# Patient Record
Sex: FEMALE | Race: BLACK OR AFRICAN AMERICAN | Employment: OTHER | URBAN - METROPOLITAN AREA
[De-identification: names, ages, dates, MRNs, and addresses within clinical notes are randomized per-mention and may not be internally consistent; named-entity substitution may affect disease eponyms.]

---

## 2017-06-12 ENCOUNTER — LAB ONLY (OUTPATIENT)
Dept: ENDOCRINOLOGY | Age: 82
End: 2017-06-12

## 2017-06-12 DIAGNOSIS — E83.52 HYPERCALCEMIA: Primary | ICD-10-CM

## 2017-06-12 DIAGNOSIS — I10 ESSENTIAL HYPERTENSION, BENIGN: ICD-10-CM

## 2017-06-12 DIAGNOSIS — E55.9 VITAMIN D DEFICIENCY: ICD-10-CM

## 2017-06-13 LAB
25(OH)D3+25(OH)D2 SERPL-MCNC: 15.2 NG/ML (ref 30–100)
BUN SERPL-MCNC: 11 MG/DL (ref 8–27)
BUN/CREAT SERPL: 17 (ref 12–28)
CALCIUM SERPL-MCNC: 10.7 MG/DL (ref 8.7–10.3)
CHLORIDE SERPL-SCNC: 105 MMOL/L (ref 96–106)
CO2 SERPL-SCNC: 23 MMOL/L (ref 18–29)
CREAT SERPL-MCNC: 0.66 MG/DL (ref 0.57–1)
GLUCOSE SERPL-MCNC: 97 MG/DL (ref 65–99)
POTASSIUM SERPL-SCNC: 4.2 MMOL/L (ref 3.5–5.2)
SODIUM SERPL-SCNC: 145 MMOL/L (ref 134–144)

## 2017-06-16 ENCOUNTER — OFFICE VISIT (OUTPATIENT)
Dept: ENDOCRINOLOGY | Age: 82
End: 2017-06-16

## 2017-06-16 VITALS
BODY MASS INDEX: 35.37 KG/M2 | SYSTOLIC BLOOD PRESSURE: 149 MMHG | WEIGHT: 207.2 LBS | DIASTOLIC BLOOD PRESSURE: 75 MMHG | HEIGHT: 64 IN | HEART RATE: 62 BPM

## 2017-06-16 DIAGNOSIS — I10 ESSENTIAL HYPERTENSION, BENIGN: ICD-10-CM

## 2017-06-16 DIAGNOSIS — E83.52 HYPERCALCEMIA: Primary | ICD-10-CM

## 2017-06-16 DIAGNOSIS — E55.9 VITAMIN D DEFICIENCY: ICD-10-CM

## 2017-06-16 RX ORDER — LOSARTAN POTASSIUM 100 MG/1
100 TABLET ORAL DAILY
Qty: 90 TAB | Refills: 3 | Status: SHIPPED | OUTPATIENT
Start: 2017-06-16 | End: 2017-12-07

## 2017-06-16 RX ORDER — METOPROLOL SUCCINATE 100 MG/1
TABLET, EXTENDED RELEASE ORAL
Qty: 90 TAB | Refills: 3 | Status: SHIPPED | OUTPATIENT
Start: 2017-06-16 | End: 2017-12-07 | Stop reason: SDUPTHER

## 2017-06-16 RX ORDER — IBUPROFEN 200 MG
200 TABLET ORAL AS NEEDED
COMMUNITY
End: 2021-04-01

## 2017-06-16 RX ORDER — FUROSEMIDE 20 MG/1
TABLET ORAL
Qty: 40 TAB | Refills: 3 | Status: SHIPPED | OUTPATIENT
Start: 2017-06-16

## 2017-06-16 RX ORDER — AMLODIPINE BESYLATE 2.5 MG/1
2.5 TABLET ORAL DAILY
Qty: 90 TAB | Refills: 3 | Status: SHIPPED | OUTPATIENT
Start: 2017-06-16

## 2017-06-16 NOTE — LETTER
7/4/2017 8:53 PM 
 
Ms. Kevin Estevez [address] [address] I wanted to update you on your recent lab tests: 
 
I received labs from Dr. Patrick King that showed your calcium level was stable at 10.7 but this was only 2 weeks after your last visit. I still need your labs repeated again prior to your next visit in 6 months. Take care. Please don't hesitate to call [phone] with any further questions.  
 
Sincerely, 
 
 
 
Rashida Dempsey MD

## 2017-06-16 NOTE — MR AVS SNAPSHOT
Visit Information Date & Time Provider Department Dept. Phone Encounter #  
 6/16/2017 10:30 AM Tosin Mora, [address] [facility] [phone] [id number] Follow-up Instructions Return in about 6 months (around 12/16/2017). Upcoming Health Maintenance Date Due DTaP/Tdap/Td series (1 - Tdap) 11/23/1956 ZOSTER VACCINE AGE 60> 11/23/1997 GLAUCOMA SCREENING Q2Y 11/23/2002 OSTEOPOROSIS SCREENING (DEXA) 11/23/2002 Pneumococcal 65+ Low/Medium Risk (1 of 2 - PCV13) 11/23/2002 MEDICARE YEARLY EXAM 11/23/2002 INFLUENZA AGE 9 TO ADULT 8/1/2017 Allergies as of 6/16/2017  Review Complete On: 6/16/2017 By: Tosin Mora MD  
  
 Severity Noted Reaction Type Reactions Hydrochlorothiazide  06/28/2016    Other (comments) Will avoid as she has hyperparathyroidism to not make calcium levels worse Current Immunizations  Reviewed on 6/4/2012 No immunizations on file. Not reviewed this visit You Were Diagnosed With   
  
 Codes Comments Hypercalcemia    -  Primary ICD-10-CM: A53.52 
ICD-9-CM: 275.42 Vitamin D deficiency     ICD-10-CM: E55.9 ICD-9-CM: 268.9 Essential hypertension, benign     ICD-10-CM: I10 
ICD-9-CM: 401.1 Vitals BP Pulse Height(growth percentile) Weight(growth percentile) BMI OB Status 149/75 (BP 1 Location: Left arm) 62 5' 4\" (1.626 m) 207 lb 3.2 oz (94 kg) 35.57 kg/m2 Hysterectomy Smoking Status Former Smoker Vitals History BMI and BSA Data Body Mass Index Body Surface Area 35.57 kg/m 2 2.06 m 2 Preferred Pharmacy Pharmacy Name Phone [facility] [address], [address] [place] [address] Avenue [phone] Your Updated Medication List  
  
   
This list is accurate as of: 6/16/17 11:11 AM.  Always use your most recent med list. ADVIL 200 mg tablet Generic drug:  ibuprofen Take  by mouth. amLODIPine 2.5 mg tablet Commonly known as:  Claudell Hesselbach Take 1 Tab by mouth daily. furosemide 20 mg tablet Commonly known as:  LASIX Take 1 tab as needed for fluid. No more than 3 times per week  
  
 losartan 100 mg tablet Commonly known as:  COZAAR Take 1 Tab by mouth daily. metoprolol succinate 100 mg tablet Commonly known as:  TOPROL-XL Take 1 tab daily  
  
 potassium chloride SR 20 mEq tablet Commonly known as:  K-TAB Take 1 tab daily Prescriptions Sent to Pharmacy Refills  
 metoprolol succinate (TOPROL-XL) 100 mg tablet 3 Sig: Take 1 tab daily Class: Normal  
 Pharmacy: [address] [place], [address] [facility] Ph #: [phone]  
 amLODIPine (NORVASC) 2.5 mg tablet 3 Sig: Take 1 Tab by mouth daily. Class: Normal  
 Pharmacy: [address] [place], [address] [facility] Ph #: [phone] Route: Oral  
 furosemide (LASIX) 20 mg tablet 3 Sig: Take 1 tab as needed for fluid. No more than 3 times per week Class: Normal  
 Pharmacy: [address] [place], [address] [facility] Ph #: [phone]  
 losartan (COZAAR) 100 mg tablet 3 Sig: Take 1 Tab by mouth daily. Class: Normal  
 Pharmacy: [address] [place], [address] [facility] Ph #: [phone] Route: Oral  
  
We Performed the Following METABOLIC PANEL, BASIC [92918 CPT(R)] Follow-up Instructions Return in about 6 months (around 12/16/2017). Patient Instructions 1) Your calcium level is stable at 10.7, up slightly from 10.6 at Dr. Liam Snachez office but down from 11.2 at your last visit with me in 12/16. 2) Drink at least 4 8oz glasses of water everyday to stay hydrated to prevent your calcium level from going higher.  
 
3) We will decrease your amlodipine to 2.5 mg daily to see if this helps with leg swelling. Take 1/2 of the 5 mg tabs until these run out and then take 1 of the 2.5 mg tabs. 4) I will increase your toprol XL to 100 mg daily for blood pressure. Take 2 of the 50 mg tabs until these run out and then take 1 of the 100 mg tabs. 5) Your BUN and creatinine are markers of kidney function. Your values are normal. 
 
6) Your vitamin D level is still appropriately low for the high calcium. Do not take any calcium, multivitamin or vitamin D supplements. 7) Take the lab slip to Dr. David Walker office or [facility] 1 week before your next visit to have your labs drawn. Introducing [facility]! [employer] introduces [employer] patient portal. Now you can access parts of your medical record, email your doctor's office, and request medication refills online. 1. In your internet browser, go to [email or web address] (formerly [email or web address])t 2. Click on the First Time User? Click Here link in the Sign In box. You will see the New Member Sign Up page. 3. Enter your [employer] Access Code exactly as it appears below. You will not need to use this code after youve completed the sign-up process. If you do not sign up before the expiration date, you must request a new code. · [employer] Access Code: [email or web address] Expires: 9/14/2017 11:07 AM 
 
4. Enter the last four digits of your Social Security Number (xxxx) and Date of Birth (mm/dd/yyyy) as indicated and click Submit. You will be taken to the next sign-up page. 5. Create a Active [employer] ID. This will be your [employer] login ID and cannot be changed, so think of one that is secure and easy to remember. 6. Create a [employer] password. You can change your password at any time. 7. Enter your Password Reset Question and Answer. This can be used at a later time if you forget your password. 8. Enter your e-mail address. You will receive e-mail notification when new information is available in [address] 9. Click Sign Up. You can now view and download portions of your medical record. 10. Click the Download Summary menu link to download a portable copy of your medical information. If you have questions, please visit the Frequently Asked Questions section of the [employer] website. Remember, [employer] is NOT to be used for urgent needs. For medical emergencies, dial 911. Now available from your iPhone and Android! Please provide this summary of care documentation to your next provider. Your primary care clinician is listed as Kade Nicole. If you have any questions after today's visit, please call [phone].

## 2017-06-16 NOTE — PROGRESS NOTES
Chief Complaint   Patient presents with    Other     hypercalcemis    Other     pcp and pharmacy confirmed     History of Present Illness: Skyla Kearns is a 80 y.o. female here for follow up of hypercalcemia. Weight down 5 lbs since last visit in 12/16. Has been taking the higher dose of toprol XL 50 mg daily along with amlodipine 5 mg daily and losartan 100 mg daily for BP. Has been taking the lasix just 3 times a week but despite this, still has some swelling in the ankles. Not taking any calcium or vitamin D or mvi. No new kidney stones. No abd pain, n/v or constipation. No new bone pain. Has been trying to drink 4 8oz glasses of water everyday. Current Outpatient Prescriptions   Medication Sig    ibuprofen (ADVIL) 200 mg tablet Take  by mouth.  metoprolol succinate (TOPROL-XL) 50 mg XL tablet Take 1 tab daily    furosemide (LASIX) 20 mg tablet Take 1 tab as needed for fluid. No more than 3 times per week--Dose change 12/15/16--updated med list--did not send prescription to the pharmacy    amLODIPine (NORVASC) 5 mg tablet Take 5 mg by mouth daily.  potassium chloride 20 mEq TbER Take 1 tab daily    losartan (COZAAR) 100 mg tablet Take 1 Tab by mouth daily. Replaces losartan-hctz for blood pressure     No current facility-administered medications for this visit. Allergies   Allergen Reactions    Hydrochlorothiazide Other (comments)     Will avoid as she has hyperparathyroidism to not make calcium levels worse     Review of Systems:  - Cardiovascular: no chest pain  - Neurological: no tremors  - Integumentary: skin is normal    Physical Examination:  Blood pressure 149/75, pulse 62, height 5' 4\" (1.626 m), weight 207 lb 3.2 oz (94 kg).   - General: pleasant, no distress, good eye contact   - Neck: no carotid bruits  - Cardiovascular: regular, normal rate, nl s1 and s2, no m/r/g   - Respiratory: clear to auscultation bilaterally   - Integumentary: skin is normal, 1+ edema at ankles  - Neurological: reflexes 2+ at biceps, no tremors  - Psychiatric: normal mood and affect    Data Reviewed:   Component      Latest Ref Rng & Units 6/12/2017 6/12/2017          10:18 AM 10:18 AM   Glucose      65 - 99 mg/dL  97   BUN      8 - 27 mg/dL  11   Creatinine      0.57 - 1.00 mg/dL  0.66   GFR est non-AA      >59 mL/min/1.73  84   GFR est AA      >59 mL/min/1.73  97   BUN/Creatinine ratio      12 - 28  17   Sodium      134 - 144 mmol/L  145 (H)   Potassium      3.5 - 5.2 mmol/L  4.2   Chloride      96 - 106 mmol/L  105   CO2      18 - 29 mmol/L  23   Calcium      8.7 - 10.3 mg/dL  10.7 (H)   VITAMIN D, 25-HYDROXY      30.0 - 100.0 ng/mL 15.2 (L)        Assessment/Plan:     1. Hypercalcemia: From her chart it appears she was admitted to [facility] in 6/12 and her calcium was high during that stay up to 11.3. She had an elevated PTH of 196 and a normal TSH, ACE level and SPEP. As far as she recalls, nobody has further evaluated and it wasn't until her calcium was high at 11.3 in 12/15 with Dr. Salina Flores that she was given a referral.  Has been on hctz for many years. Likely she has primary hyperparathyroidism and her PTH level may also be higher due to underlying secondary hyperparathyroidism from vitamin D deficiency. Given her age, I would prefer to manage this condition conservatively. Her calcium was 10.9 in 6/16 at her initial visit with me and I stopped the hctz and changed to lasix but despite this her calcium went to 11.2 in 12/16. She has no history of kidney stones or osteoporosis to warrant more aggressive management at this time. I had her stop the lasix and just take this as needed in case this is dehydrating her and keeping her calcium up and level down to 10.6 at the end of 12/16 and still 10.7 in 6/17  - avoid calcium and mvi  - check bmp prior to next visit in 6 months      2.  Vitamin D deficiency: Was told that her vitamin D level was low by Alyson sometime prior to her visit with me in 6/16 and was taking 1000 units of OTC D3 daily but hadn't taken this in the past month prior to the visit and level was 17.7 in 6/16. I had her remain off any vitamin D and level down to 14 in 12/16 and still 15 in 6/17 but this is an appropriately low compensation for her degree of calcium elevation and I want her to stay off any vitamin D supplementation or this can make her calcium level higher.  - check Vitamin D 25-OH level prior to next visit     3. Essential hypertension, benign: BP > 140/90. Will double her toprol XL and decrease her amlodipine to see if this helps with leg swelling.  - cont losartan 100 mg daily  - take lasix 20 mg only as needed for leg swelling no more than 3 times per week  - decrease amlodipine to 2.5 mg daily  - increase toprol XL to 100 mg daily      Patient Instructions   1) Your calcium level is stable at 10.7, up slightly from 10.6 at Dr. Yenifer Dumont office but down from 11.2 at your last visit with me in 12/16. 2) Drink at least 4 8oz glasses of water everyday to stay hydrated to prevent your calcium level from going higher. 3) We will decrease your amlodipine to 2.5 mg daily to see if this helps with leg swelling. Take 1/2 of the 5 mg tabs until these run out and then take 1 of the 2.5 mg tabs. 4) I will increase your toprol XL to 100 mg daily for blood pressure. Take 2 of the 50 mg tabs until these run out and then take 1 of the 100 mg tabs. 5) Your BUN and creatinine are markers of kidney function. Your values are normal.    6) Your vitamin D level is still appropriately low for the high calcium. Do not take any calcium, multivitamin or vitamin D supplements. 7) Take the lab slip to Dr. Yenifer Dumont office or [facility] 1 week before your next visit to have your labs drawn. Follow-up Disposition:  Return in about 6 months (around 12/16/2017).     Copy sent to:  Dr. Lobo Monsivais [place]    Lab follow up: 7/4/17  Received labs from Dr. Tg Henderson 6/29/17:  - BUN/Cr 7/0.63  - calcium 10.7    Sent her the following message in a letter:    I received labs from Dr. Tg Henderson that showed your calcium level was stable at 10.7 but this was only 2 weeks after your last visit. I still need your labs repeated again prior to your next visit in 6 months. Take care.

## 2017-06-16 NOTE — PATIENT INSTRUCTIONS
1) Your calcium level is stable at 10.7, up slightly from 10.6 at Dr. Marilu Jung office but down from 11.2 at your last visit with me in 12/16. 2) Drink at least 4 8oz glasses of water everyday to stay hydrated to prevent your calcium level from going higher. 3) We will decrease your amlodipine to 2.5 mg daily to see if this helps with leg swelling. Take 1/2 of the 5 mg tabs until these run out and then take 1 of the 2.5 mg tabs. 4) I will increase your toprol XL to 100 mg daily for blood pressure. Take 2 of the 50 mg tabs until these run out and then take 1 of the 100 mg tabs. 5) Your BUN and creatinine are markers of kidney function. Your values are normal.    6) Your vitamin D level is still appropriately low for the high calcium. Do not take any calcium, multivitamin or vitamin D supplements. 7) Take the lab slip to Dr. Marilu Jung office or [facility] 1 week before your next visit to have your labs drawn.

## 2017-12-02 LAB
BUN SERPL-MCNC: 8 MG/DL (ref 8–27)
BUN/CREAT SERPL: 12 (ref 12–28)
CALCIUM SERPL-MCNC: 10.4 MG/DL (ref 8.7–10.3)
CHLORIDE SERPL-SCNC: 102 MMOL/L (ref 96–106)
CO2 SERPL-SCNC: 22 MMOL/L (ref 18–29)
CREAT SERPL-MCNC: 0.67 MG/DL (ref 0.57–1)
GFR SERPLBLD CREATININE-BSD FMLA CKD-EPI: 83 ML/MIN/1.73
GFR SERPLBLD CREATININE-BSD FMLA CKD-EPI: 96 ML/MIN/1.73
GLUCOSE SERPL-MCNC: 101 MG/DL (ref 65–99)
POTASSIUM SERPL-SCNC: 4.3 MMOL/L (ref 3.5–5.2)
SODIUM SERPL-SCNC: 142 MMOL/L (ref 134–144)

## 2017-12-07 ENCOUNTER — OFFICE VISIT (OUTPATIENT)
Dept: ENDOCRINOLOGY | Age: 82
End: 2017-12-07

## 2017-12-07 VITALS
SYSTOLIC BLOOD PRESSURE: 166 MMHG | DIASTOLIC BLOOD PRESSURE: 66 MMHG | HEART RATE: 62 BPM | WEIGHT: 204.8 LBS | RESPIRATION RATE: 18 BRPM | HEIGHT: 64 IN | TEMPERATURE: 95.8 F | BODY MASS INDEX: 34.97 KG/M2

## 2017-12-07 DIAGNOSIS — I10 ESSENTIAL HYPERTENSION, BENIGN: ICD-10-CM

## 2017-12-07 DIAGNOSIS — E83.52 HYPERCALCEMIA: Primary | ICD-10-CM

## 2017-12-07 DIAGNOSIS — E55.9 VITAMIN D DEFICIENCY: ICD-10-CM

## 2017-12-07 RX ORDER — VALSARTAN 320 MG/1
320 TABLET ORAL DAILY
COMMUNITY
Start: 2017-11-27 | End: 2019-02-11

## 2017-12-07 RX ORDER — METOPROLOL SUCCINATE 100 MG/1
TABLET, EXTENDED RELEASE ORAL
Qty: 90 TAB | Refills: 3 | Status: SHIPPED | OUTPATIENT
Start: 2017-12-07 | End: 2018-12-14 | Stop reason: SDUPTHER

## 2017-12-07 NOTE — PROGRESS NOTES
Arturo Mackenzie is a [de-identified] y.o. female      Chief Complaint   Patient presents with    Other     hypercalcemis    Other     PCP and Pharmacy Verified. 1. Have you been to the ER, urgent care clinic since your last visit? Hospitalized since your last visit? No    2. Have you seen or consulted any other health care providers outside of the [address] [place] [address] since your last visit? Include any pap smears or colon screening.  No

## 2017-12-07 NOTE — PROGRESS NOTES
Chief Complaint   Patient presents with    Other     hypercalcemis    Other     PCP and Pharmacy Verified. History of Present Illness: Domonique Haider is a [de-identified] y.o. female here for follow up of hypercalcemia. Weight down 3 lbs since last visit in 6/17. Apparently after last visit the rx I sent for 90 tabs of 100 mg of Toprol XL was never received by Araceli Rodgers. As a result she has still been using the 50 mg tabs and has been taking 1 tab daily but sometimes will take 2 tabs so she won't run out of the bottle too soon. Has had less leg swelling with the lower dose of amlodipine. Dr. Yrn Belle changed the losartan to valsartan sometime since last visit. She saw Dr. Venkata Oates yesterday and apparently had a change on her EKG but since she isn't having any shortness of breath, he'll hold on a stress test and see her back in 6 months. Not taking any calcium or vit D or mvi. Doing her best to drink 4 8oz glasses of water per day. No new kidney stones. No abd pain or constipation. States her BP was better yesterday at cardiology office in the 487G systolic but did take 2 tabs of toprol XL yesterday and this morning has only taken one tab so I had her take a 2nd dose while with me in the office. Current Outpatient Prescriptions   Medication Sig    valsartan (DIOVAN) 320 mg tablet Take 320 mg by mouth daily.  ibuprofen (ADVIL) 200 mg tablet Take 200 mg by mouth as needed.  metoprolol succinate (TOPROL-XL) 100 mg tablet Take 1 tab daily (Patient taking differently: Take 50 mg by mouth daily. Take 1 tab daily)    amLODIPine (NORVASC) 2.5 mg tablet Take 1 Tab by mouth daily.  furosemide (LASIX) 20 mg tablet Take 1 tab as needed for fluid. No more than 3 times per week    potassium chloride 20 mEq TbER Take 1 tab daily    losartan (COZAAR) 100 mg tablet Take 1 Tab by mouth daily. No current facility-administered medications for this visit.       Allergies   Allergen Reactions    Hydrochlorothiazide Other (comments)     Will avoid as she has hyperparathyroidism to not make calcium levels worse     Review of Systems:  - Cardiovascular: no chest pain  - Neurological: no tremors  - Integumentary: skin is normal    Physical Examination:  Blood pressure 166/66, pulse 62, temperature 95.8 °F (35.4 °C), temperature source Oral, resp. rate 18, height 5' 4\" (1.626 m), weight 204 lb 12.8 oz (92.9 kg). Repeat manually 188/88 in left arm.  - General: pleasant, no distress, good eye contact   - Neck: no carotid bruits  - Cardiovascular: regular, normal rate, nl s1 and s2, no m/r/g   - Respiratory: clear to auscultation bilaterally   - Integumentary: skin is normal, no edema  - Neurological: reflexes 2+ at biceps, no tremors  - Psychiatric: normal mood and affect    Data Reviewed:   Component      Latest Ref Rng & Units 12/1/2017          10:51 AM   Glucose      65 - 99 mg/dL 101 (H)   BUN      8 - 27 mg/dL 8   Creatinine      0.57 - 1.00 mg/dL 0.67   GFR est non-AA      >59 mL/min/1.73 83   GFR est AA      >59 mL/min/1.73 96   BUN/Creatinine ratio      12 - 28 12   Sodium      134 - 144 mmol/L 142   Potassium      3.5 - 5.2 mmol/L 4.3   Chloride      96 - 106 mmol/L 102   CO2      18 - 29 mmol/L 22   Calcium      8.7 - 10.3 mg/dL 10.4 (H)       Assessment/Plan:     1. Hypercalcemia: From her chart it appears she was admitted to [facility] in 6/12 and her calcium was high during that stay up to 11.3. She had an elevated PTH of 196 and a normal TSH, ACE level and SPEP. As far as she recalls, nobody has further evaluated and it wasn't until her calcium was high at 11.3 in 12/15 with Dr. Frieda Weir that she was given a referral.  Has been on hctz for many years. Likely she has primary hyperparathyroidism and her PTH level may also be higher due to underlying secondary hyperparathyroidism from vitamin D deficiency. Given her age, I would prefer to manage this condition conservatively.  Her calcium was 10.9 in 6/16 at her initial visit with me and I stopped the hctz and changed to lasix but despite this her calcium went to 11.2 in 12/16. She has no history of kidney stones or osteoporosis to warrant more aggressive management at this time. I had her stop the lasix and just take this as needed in case this is dehydrating her and keeping her calcium up and level down to 10.6 at the end of 12/16 and still 10.7 in 6/17 but down to 10.4 in 12/17 with pushing more fluid. Will see her annually at this point and Dr. Alesha Reis can repeat her levels in 4-6 months. - avoid calcium and mvi  - check bmp prior to next visit       2. Vitamin D deficiency: Was told that her vitamin D level was low by Alyson sometime prior to her visit with me in 6/16 and was taking 1000 units of OTC D3 daily but hadn't taken this in the past month prior to the visit and level was 17.7 in 6/16. I had her remain off any vitamin D and level down to 14 in 12/16 and still 15 in 6/17 but this is an appropriately low compensation for her degree of calcium elevation and I want her to stay off any vitamin D supplementation or this can make her calcium level higher. 3. Essential hypertension, benign: BP > 140/90. I doubled her toprol XL in 6/17 from 50 to 100 but Dejon Simmons never received this rx so she has remained on 50 mg daily so printed a new rx to [facility] to [facility] today. - cont valsartan 320 mg daily  - take lasix 20 mg only as needed for leg swelling no more than 3 times per week  - cont amlodipine 2.5 mg daily  - increase toprol XL to 100 mg daily      Patient Instructions   1) Your calcium is 10.4 (normal is up to 10.3) so you are just barely over the normal range and this is the lowest it's been since June 2016. Keep drinking 4 8oz glasses of water and avoid all calcium and vitamin D and multivitamins.     2) Use up the bottle of the 50 mg toprol XL tabs taking 2 tabs daily and then bring the prescription for 100 mg tabs to the pharmacy to have filled to take one tab daily. 3) Make sure you follow up with Dr. Yvette Al in the next 2-3 months to check your blood pressure. 4) I will plan on seeing your back in one year and Dr. Yvette Al can check your calcium again sometime in the next 4-6 months and as long as it stays under 11, you won't need to see me sooner. 5) I will send you a reminder letter 3-4 weeks prior to your next visit and you will have the order already in the [facility] system so you can just go sometime in the 3-7 days before the next visit to have your labs drawn. Follow-up Disposition:  Return in about 1 year (around 12/7/2018).     Copy sent to:  Dr. Abdirashid Meraz [place]

## 2017-12-07 NOTE — MR AVS SNAPSHOT
Visit Information Date & Time Provider Department Dept. Phone Encounter #  
 12/7/2017 11:50 AM Misti Gar MD [facility] [phone] [id number] Follow-up Instructions Return in about 1 year (around 12/7/2018). Upcoming Health Maintenance Date Due DTaP/Tdap/Td series (1 - Tdap) 11/23/1958 ZOSTER VACCINE AGE 60> 9/23/1997 GLAUCOMA SCREENING Q2Y 11/23/2002 OSTEOPOROSIS SCREENING (DEXA) 11/23/2002 Pneumococcal 65+ Low/Medium Risk (1 of 2 - PCV13) 11/23/2002 MEDICARE YEARLY EXAM 11/23/2002 Influenza Age 5 to Adult 8/1/2017 Allergies as of 12/7/2017  Review Complete On: 12/7/2017 By: Misti Gar MD  
  
 Severity Noted Reaction Type Reactions Hydrochlorothiazide  06/28/2016    Other (comments) Will avoid as she has hyperparathyroidism to not make calcium levels worse Current Immunizations  Reviewed on 6/4/2012 No immunizations on file. Not reviewed this visit You Were Diagnosed With   
  
 Codes Comments Hypercalcemia    -  Primary ICD-10-CM: A98.08 
ICD-9-CM: 275.42 Vitamin D deficiency     ICD-10-CM: E55.9 ICD-9-CM: 268.9 Essential hypertension, benign     ICD-10-CM: I10 
ICD-9-CM: 401.1 Vitals BP Pulse Temp Resp Height(growth percentile) Weight(growth percentile) 166/66 (BP 1 Location: Left arm, BP Patient Position: Sitting) 62 95.8 °F (35.4 °C) (Oral) 18 5' 4\" (1.626 m) 204 lb 12.8 oz (92.9 kg) BMI OB Status Smoking Status 35.15 kg/m2 Hysterectomy Former Smoker Vitals History BMI and BSA Data Body Mass Index Body Surface Area  
 35.15 kg/m 2 2.05 m 2 Preferred Pharmacy Pharmacy Name Phone [facility] [address] [phone] Your Updated Medication List  
  
   
This list is accurate as of: 12/7/17 12:42 PM.  Always use your most recent med list.  
  
  
  
  
 ADVIL 200 mg tablet Generic drug:  ibuprofen Take 200 mg by mouth as needed. amLODIPine 2.5 mg tablet Commonly known as:  Gilliam Mullet Take 1 Tab by mouth daily. furosemide 20 mg tablet Commonly known as:  LASIX Take 1 tab as needed for fluid. No more than 3 times per week  
  
 metoprolol succinate 100 mg tablet Commonly known as:  TOPROL-XL Take 1 tab daily  
  
 potassium chloride SR 20 mEq tablet Commonly known as:  K-TAB Take 1 tab daily  
  
 valsartan 320 mg tablet Commonly known as:  DIOVAN Take 320 mg by mouth daily. Prescriptions Printed Refills  
 metoprolol succinate (TOPROL-XL) 100 mg tablet 3 Sig: Take 1 tab daily Class: Print Follow-up Instructions Return in about 1 year (around 12/7/2018). Patient Instructions 1) Your calcium is 10.4 (normal is up to 10.3) so you are just barely over the normal range and this is the lowest it's been since June 2016. Keep drinking 4 8oz glasses of water and avoid all calcium and vitamin D and multivitamins. 2) Use up the bottle of the 50 mg toprol XL tabs taking 2 tabs daily and then bring the prescription for 100 mg tabs to the pharmacy to have filled to take one tab daily. 3) Make sure you follow up with Dr. Darlene Srinivasan in the next 2-3 months to check your blood pressure. 4) I will plan on seeing your back in one year and Dr. Darlene Srinivasan can check your calcium again sometime in the next 4-6 months and as long as it stays under 11, you won't need to see me sooner. 5) I will send you a reminder letter 3-4 weeks prior to your next visit and you will have the order already in the [facility] system so you can just go sometime in the 3-7 days before the next visit to have your labs drawn. Introducing [facility]! Amna Marte introduces [employer] patient portal. Now you can access parts of your medical record, email your doctor's office, and request medication refills online. 1. In your internet browser, go to [email or web address] 2. Click on the First Time User? Click Here link in the Sign In box. You will see the New Member Sign Up page. 3. Enter your MedWhat Access Code exactly as it appears below. You will not need to use this code after youve completed the sign-up process. If you do not sign up before the expiration date, you must request a new code. · MedWhat Access Code: [email or web address] Expires: 3/7/2018 12:42 PM 
 
4. Enter the last four digits of your Social Security Number (xxxx) and Date of Birth (mm/dd/yyyy) as indicated and click Submit. You will be taken to the next sign-up page. 5. Create a [employer] ID. This will be your MedWhat login ID and cannot be changed, so think of one that is secure and easy to remember. 6. Create a MedWhat password. You can change your password at any time. 7. Enter your Password Reset Question and Answer. This can be used at a later time if you forget your password. 8. Enter your e-mail address. You will receive e-mail notification when new information is available in [address] 9. Click Sign Up. You can now view and download portions of your medical record. 10. Click the Download Summary menu link to download a portable copy of your medical information. If you have questions, please visit the Frequently Asked Questions section of the MedWhat website. Remember, MedWhat is NOT to be used for urgent needs. For medical emergencies, dial 911. Now available from your iPhone and Android! Please provide this summary of care documentation to your next provider. Your primary care clinician is listed as Mohsen Nava. If you have any questions after today's visit, please call [phone].

## 2017-12-07 NOTE — PATIENT INSTRUCTIONS
1) Your calcium is 10.4 (normal is up to 10.3) so you are just barely over the normal range and this is the lowest it's been since June 2016. Keep drinking 4 8oz glasses of water and avoid all calcium and vitamin D and multivitamins. 2) Use up the bottle of the 50 mg toprol XL tabs taking 2 tabs daily and then bring the prescription for 100 mg tabs to the pharmacy to have filled to take one tab daily. 3) Make sure you follow up with Dr. Aneta Larsen in the next 2-3 months to check your blood pressure. 4) I will plan on seeing your back in one year and Dr. Aneta Larsen can check your calcium again sometime in the next 4-6 months and as long as it stays under 11, you won't need to see me sooner. 5) I will send you a reminder letter 3-4 weeks prior to your next visit and you will have the order already in the [facility] system so you can just go sometime in the 3-7 days before the next visit to have your labs drawn.

## 2018-12-14 RX ORDER — METOPROLOL SUCCINATE 100 MG/1
TABLET, EXTENDED RELEASE ORAL
Qty: 90 TAB | Refills: 3 | Status: SHIPPED | OUTPATIENT
Start: 2018-12-14 | End: 2018-12-14 | Stop reason: SDUPTHER

## 2018-12-14 RX ORDER — METOPROLOL SUCCINATE 100 MG/1
TABLET, EXTENDED RELEASE ORAL
Qty: 90 TAB | Refills: 3 | Status: SHIPPED | OUTPATIENT
Start: 2018-12-14 | End: 2019-12-20

## 2019-02-11 ENCOUNTER — TELEPHONE (OUTPATIENT)
Dept: ENDOCRINOLOGY | Age: 84
End: 2019-02-11

## 2019-02-11 ENCOUNTER — OFFICE VISIT (OUTPATIENT)
Dept: ENDOCRINOLOGY | Age: 84
End: 2019-02-11

## 2019-02-11 VITALS
BODY MASS INDEX: 34.86 KG/M2 | SYSTOLIC BLOOD PRESSURE: 144 MMHG | HEIGHT: 64 IN | DIASTOLIC BLOOD PRESSURE: 72 MMHG | HEART RATE: 63 BPM | WEIGHT: 204.2 LBS

## 2019-02-11 DIAGNOSIS — E83.52 HYPERCALCEMIA: Primary | ICD-10-CM

## 2019-02-11 DIAGNOSIS — E55.9 VITAMIN D DEFICIENCY: ICD-10-CM

## 2019-02-11 DIAGNOSIS — I10 ESSENTIAL HYPERTENSION, BENIGN: ICD-10-CM

## 2019-02-11 RX ORDER — IRBESARTAN 300 MG/1
300 TABLET ORAL DAILY
COMMUNITY
Start: 2018-11-28

## 2019-02-11 NOTE — TELEPHONE ENCOUNTER
Please call Alyson's office to confirm receipt of my office note that was electronically faxed. If not received, please manually fax.

## 2019-02-11 NOTE — PATIENT INSTRUCTIONS
1) Your calcium level is back to normal at 10.2 so you won't need to come back and see me in the future. 2) Please keep having Dr. Jamilah Stuart check your level every 6-12 months and as long as the value stays under 11, you won't need to come back. 3) Keep drinking 32 oz of water per day and avoid all calcium and vitamin D and multivitamins.

## 2019-02-11 NOTE — PROGRESS NOTES
Chief Complaint   Patient presents with    Follow-up     Hypercalcemia     History of Present Illness: Brooke Yañez is a 82 y.o. female here for follow up of thyroid. Weight stable since last visit in 12/17. Her valsartan was changed to irbesartan 300 mg daily in the summer when valsartan was recalled. Still taking toprol  mg daily and amlodipine 2.5 mg daily. Saw Dr. Yessica Hope and had a stress test and her BP was fine with him. No kidney stones or hematuria or dysuria. No constipation. Still drinking at least 32 oz of water per day. Current Outpatient Medications   Medication Sig    irbesartan (AVAPRO) 300 mg tablet Take 300 mg by mouth daily.  metoprolol succinate (TOPROL-XL) 100 mg tablet TAKE ONE TABLET BY MOUTH ONCE DAILY    ibuprofen (ADVIL) 200 mg tablet Take 200 mg by mouth as needed.  amLODIPine (NORVASC) 2.5 mg tablet Take 1 Tab by mouth daily.  furosemide (LASIX) 20 mg tablet Take 1 tab as needed for fluid. No more than 3 times per week    potassium chloride 20 mEq TbER Take 1 tab daily     No current facility-administered medications for this visit. Allergies   Allergen Reactions    Hydrochlorothiazide Other (comments)     Will avoid as she has hyperparathyroidism to not make calcium levels worse     Review of Systems:  - Cardiovascular: no chest pain  - Neurological: no tremors  - Integumentary: skin is normal    Physical Examination:  Blood pressure 144/72, pulse 63, height 5' 4\" (1.626 m), weight 204 lb 3.2 oz (92.6 kg).   - General: pleasant, no distress, good eye contact   - Neck: no carotid bruits  - Cardiovascular: regular, normal rate, nl s1 and s2, no m/r/g   - Respiratory: clear to auscultation bilaterally   - Integumentary: skin is normal, no edema  - Neurological: reflexes 2+ at biceps, no tremors  - Psychiatric: normal mood and affect    Data Reviewed:   Component      Latest Ref Rng & Units 2/8/2019          11:17 AM   Glucose      65 - 99 mg/dL 123 (H)   BUN      8 - 27 mg/dL 11   Creatinine      0.57 - 1.00 mg/dL 0.80   GFR est non-AA      >59 mL/min/1.73 69   GFR est AA      >59 mL/min/1.73 80   BUN/Creatinine ratio      12 - 28 14   Sodium      134 - 144 mmol/L 144   Potassium      3.5 - 5.2 mmol/L 4.0   Chloride      96 - 106 mmol/L 108 (H)   CO2      20 - 29 mmol/L 20   Calcium      8.7 - 10.3 mg/dL 10.2       Assessment/Plan:     1. Hypercalcemia: From her chart it appears she was admitted to [facility] in 6/12 and her calcium was high during that stay up to 11.3. She had an elevated PTH of 196 and a normal TSH, ACE level and SPEP. As far as she recalls, nobody has further evaluated and it wasn't until her calcium was high at 11.3 in 12/15 with Dr. Mark Anthony Clemente that she was given a referral.  Has been on hctz for many years. Likely she has primary hyperparathyroidism and her PTH level may also be higher due to underlying secondary hyperparathyroidism from vitamin D deficiency. Given her age, I would prefer to manage this condition conservatively. Her calcium was 10.9 in 6/16 at her initial visit with me and I stopped the hctz and changed to lasix but despite this her calcium went to 11.2 in 12/16. She has no history of kidney stones or osteoporosis to warrant more aggressive management at this time. I had her stop the lasix and just take this as needed in case this is dehydrating her and keeping her calcium up and level down to 10.6 at the end of 12/16 and still 10.7 in 6/17 but down to 10.4 in 12/17 with pushing more fluid and back to normal at 10.2 in 2/19. At this point, she can just follow up with Dr. Mark Anthony Clemente and he can repeat her levels every 6-12 months. - avoid calcium and mvi  - check bmp every 6-12 months       2.  Vitamin D deficiency: Was told that her vitamin D level was low by Alyson sometime prior to her visit with me in 6/16 and was taking 1000 units of OTC D3 daily but hadn't taken this in the past month prior to the visit and level was 17.7 in 6/16. I had her remain off any vitamin D and level down to 14 in 12/16 and still 15 in 6/17 but this is an appropriately low compensation for her degree of calcium elevation and I want her to stay off any vitamin D supplementation or this can make her calcium level higher. 3. Essential hypertension, benign: BP just above goal < 140/90.  - cont irbesartan 300 mg daily  - take lasix 20 mg only as needed for leg swelling no more than 3 times per week  - cont amlodipine 2.5 mg daily  - cont toprol  mg daily      Patient Instructions   1) Your calcium level is back to normal at 10.2 so you won't need to come back and see me in the future. 2) Please keep having Dr. Tiffanie Sawyer check your level every 6-12 months and as long as the value stays under 11, you won't need to come back. 3) Keep drinking 32 oz of water per day and avoid all calcium and vitamin D and multivitamins. Follow-up Disposition:  Return if symptoms worsen or fail to improve.     Copy sent to:  Dr. Branch Me  Dr. Dipesh Ruiz

## 2019-02-12 NOTE — TELEPHONE ENCOUNTER
Spoke to [employer] in Dr. Lillian Ridley office, and confirmed receipt of the office note that was faxed over.

## 2019-12-20 RX ORDER — METOPROLOL SUCCINATE 100 MG/1
TABLET, EXTENDED RELEASE ORAL
Qty: 90 TAB | Refills: 0 | Status: SHIPPED | OUTPATIENT
Start: 2019-12-20

## 2020-03-20 ENCOUNTER — TELEPHONE (OUTPATIENT)
Dept: ENDOCRINOLOGY | Age: 85
End: 2020-03-20

## 2020-03-20 RX ORDER — METOPROLOL SUCCINATE 100 MG/1
TABLET, EXTENDED RELEASE ORAL
Qty: 90 TAB | Refills: 0 | OUTPATIENT
Start: 2020-03-20

## 2020-03-20 NOTE — TELEPHONE ENCOUNTER
Please call her pharmacy and have them send the refill request for metoprolol to Dr. Jose L Andrews as I had written on the sig on 12/20/19 as she is no longer under my care. Thanks.

## 2021-04-01 ENCOUNTER — APPOINTMENT (OUTPATIENT)
Dept: ULTRASOUND IMAGING | Age: 86
End: 2021-04-01
Attending: EMERGENCY MEDICINE
Payer: MEDICARE

## 2021-04-01 ENCOUNTER — APPOINTMENT (OUTPATIENT)
Dept: GENERAL RADIOLOGY | Age: 86
End: 2021-04-01
Attending: EMERGENCY MEDICINE
Payer: MEDICARE

## 2021-04-01 ENCOUNTER — HOSPITAL ENCOUNTER (EMERGENCY)
Age: 86
Discharge: HOME | End: 2021-04-01
Attending: EMERGENCY MEDICINE
Payer: MEDICARE

## 2021-04-01 VITALS
OXYGEN SATURATION: 94 % | WEIGHT: 160 LBS | HEIGHT: 63 IN | TEMPERATURE: 97.9 F | DIASTOLIC BLOOD PRESSURE: 52 MMHG | SYSTOLIC BLOOD PRESSURE: 118 MMHG | RESPIRATION RATE: 21 BRPM | HEART RATE: 59 BPM | BODY MASS INDEX: 28.35 KG/M2

## 2021-04-01 DIAGNOSIS — M13.10 MONOARTICULAR ARTHRITIS: Primary | ICD-10-CM

## 2021-04-01 LAB
ALBUMIN SERPL-MCNC: 3.5 G/DL (ref 3.5–5)
ALBUMIN/GLOB SERPL: 0.9 {RATIO} (ref 1.1–2.2)
ALP SERPL-CCNC: 74 U/L (ref 45–117)
ALT SERPL-CCNC: 15 U/L (ref 12–78)
ANION GAP SERPL CALC-SCNC: 6 MMOL/L (ref 5–15)
AST SERPL-CCNC: 18 U/L (ref 15–37)
BASOPHILS # BLD: 0 K/UL (ref 0–0.1)
BASOPHILS NFR BLD: 0 % (ref 0–1)
BILIRUB SERPL-MCNC: 0.6 MG/DL (ref 0.2–1)
BUN SERPL-MCNC: 12 MG/DL (ref 6–20)
BUN/CREAT SERPL: 13 (ref 12–20)
CALCIUM SERPL-MCNC: 10.1 MG/DL (ref 8.5–10.1)
CHLORIDE SERPL-SCNC: 109 MMOL/L (ref 97–108)
CO2 SERPL-SCNC: 27 MMOL/L (ref 21–32)
CREAT SERPL-MCNC: 0.91 MG/DL (ref 0.55–1.02)
DIFFERENTIAL METHOD BLD: NORMAL
EOSINOPHIL # BLD: 0 K/UL (ref 0–0.4)
EOSINOPHIL NFR BLD: 1 % (ref 0–7)
ERYTHROCYTE [DISTWIDTH] IN BLOOD BY AUTOMATED COUNT: 13.5 % (ref 11.5–14.5)
GLOBULIN SER CALC-MCNC: 4.1 G/DL (ref 2–4)
GLUCOSE SERPL-MCNC: 100 MG/DL (ref 65–100)
HCT VFR BLD AUTO: 38.1 % (ref 35–47)
HGB BLD-MCNC: 11.8 G/DL (ref 11.5–16)
IMM GRANULOCYTES # BLD AUTO: 0 K/UL (ref 0–0.04)
IMM GRANULOCYTES NFR BLD AUTO: 0 % (ref 0–0.5)
LYMPHOCYTES # BLD: 1 K/UL (ref 0.8–3.5)
LYMPHOCYTES NFR BLD: 22 % (ref 12–49)
MCH RBC QN AUTO: 29.4 PG (ref 26–34)
MCHC RBC AUTO-ENTMCNC: 31 G/DL (ref 30–36.5)
MCV RBC AUTO: 94.8 FL (ref 80–99)
MONOCYTES # BLD: 0.2 K/UL (ref 0–1)
MONOCYTES NFR BLD: 5 % (ref 5–13)
NEUTS SEG # BLD: 3.3 K/UL (ref 1.8–8)
NEUTS SEG NFR BLD: 72 % (ref 32–75)
NRBC # BLD: 0 K/UL (ref 0–0.01)
NRBC BLD-RTO: 0 PER 100 WBC
PLATELET # BLD AUTO: 179 K/UL (ref 150–400)
PMV BLD AUTO: 10.5 FL (ref 8.9–12.9)
POTASSIUM SERPL-SCNC: 3.8 MMOL/L (ref 3.5–5.1)
PROT SERPL-MCNC: 7.6 G/DL (ref 6.4–8.2)
RBC # BLD AUTO: 4.02 M/UL (ref 3.8–5.2)
SODIUM SERPL-SCNC: 142 MMOL/L (ref 136–145)
WBC # BLD AUTO: 4.7 K/UL (ref 3.6–11)

## 2021-04-01 PROCEDURE — 93971 EXTREMITY STUDY: CPT

## 2021-04-01 PROCEDURE — 99285 EMERGENCY DEPT VISIT HI MDM: CPT

## 2021-04-01 PROCEDURE — 74011250637 HC RX REV CODE- 250/637: Performed by: EMERGENCY MEDICINE

## 2021-04-01 PROCEDURE — 80053 COMPREHEN METABOLIC PANEL: CPT

## 2021-04-01 PROCEDURE — 85025 COMPLETE CBC W/AUTO DIFF WBC: CPT

## 2021-04-01 PROCEDURE — 73610 X-RAY EXAM OF ANKLE: CPT

## 2021-04-01 PROCEDURE — 36415 COLL VENOUS BLD VENIPUNCTURE: CPT

## 2021-04-01 RX ORDER — IBUPROFEN 400 MG/1
400 TABLET ORAL
Qty: 30 TAB | Refills: 0 | Status: SHIPPED | OUTPATIENT
Start: 2021-04-01

## 2021-04-01 RX ORDER — ACETAMINOPHEN 325 MG/1
650 TABLET ORAL
Qty: 20 TAB | Refills: 0 | Status: SHIPPED | OUTPATIENT
Start: 2021-04-01

## 2021-04-01 RX ORDER — ACETAMINOPHEN 500 MG
1000 TABLET ORAL ONCE
Status: COMPLETED | OUTPATIENT
Start: 2021-04-01 | End: 2021-04-01

## 2021-04-01 RX ORDER — IBUPROFEN 400 MG/1
400 TABLET ORAL
Status: COMPLETED | OUTPATIENT
Start: 2021-04-01 | End: 2021-04-01

## 2021-04-01 RX ADMIN — ACETAMINOPHEN 1000 MG: 500 TABLET, FILM COATED ORAL at 13:24

## 2021-04-01 RX ADMIN — IBUPROFEN 400 MG: 400 TABLET, FILM COATED ORAL at 13:24

## 2021-04-01 NOTE — ED NOTES
Assumed care from triage. Pt placed on the monitor x 3, son at bedside. Ptreporting onset of sx about 1 mo ago. Pt son reports that he brought her into ED d/t difficulty ambulating secondary to R leg discomfort. Pt began using cane for ambulation assistance about 1 mo ago d/t R leg pain and swelling. Pt reporting 0/10 pain at this time, reports that pain only occurs during ambulation. Pt denies CP, SOB, weakness or fatigue. 1300 MD at bedside evaluating pt      1320 X ray at bedside. 1325 Pt medicated per orders     1445 Pt resting comfortably in bed, denies pain at this time. 1540 Pt discharged by Dr Lavaun Severs. Pt provided with discharge instructions Rx and instructions on follow up care. Pt out of ED via wheelchair accompanied by wheelchair.

## 2021-04-01 NOTE — DISCHARGE INSTRUCTIONS
It was a pleasure taking care of you in our Emergency Department today. We know that when you come to [address] [place] [address], you are entrusting us with your health, comfort, and safety. Our physicians and nurses honor that trust, and truly appreciate the opportunity to care for you and your loved ones. We also value your feedback. If you receive a survey about your Emergency Department experience today, please fill it out. We care about our patients' feedback, and we listen to what you have to say. Thank you!

## 2021-04-06 NOTE — ED PROVIDER NOTES
EMERGENCY DEPARTMENT HISTORY AND PHYSICAL EXAM      Date: 4/1/2021  Patient Name: Kenji Hodge    History of Presenting Illness     Chief Complaint   Patient presents with    Leg Pain     with swelling x 3-4 months. Pt reports she is not able to put weight on it, denies any known injury. History Provided By: Patient    HPI: Kenji Hodge, 82 y.o. female with a past medical history significant for medical problems as stated below presents to the ED with cc of moderate to severe lower leg pain over the past 3-4 months. Pain is mostly  Isolated to the ankle and is worse with weight-bearing. Mild associated swelling without redness, fever, or systemic signs of infection. Son has some concern there could be a blood clot, which is why they present today. No associated trauma. No other associated symptoms. No other exacerbation or ameliorating factors. There are no other complaints, changes, or physical findings at this time. PCP: Natalia Malave MD    No current facility-administered medications on file prior to encounter. Current Outpatient Medications on File Prior to Encounter   Medication Sig Dispense Refill    metoprolol succinate (TOPROL-XL) 100 mg tablet TAKE 1 TABLET BY MOUTH ONCE DAILY--FUTURE REFILLS TO DR SCOTT MENDOZA 90 Tab 0    irbesartan (AVAPRO) 300 mg tablet Take 300 mg by mouth daily.  amLODIPine (NORVASC) 2.5 mg tablet Take 1 Tab by mouth daily. 90 Tab 3    furosemide (LASIX) 20 mg tablet Take 1 tab as needed for fluid.   No more than 3 times per week 40 Tab 3    potassium chloride 20 mEq TbER Take 1 tab daily         Past History     Past Medical History:  Past Medical History:   Diagnosis Date    DJD (degenerative joint disease)     Hypercalcemia     Hypertension     Vitamin D deficiency        Past Surgical History:  Past Surgical History:   Procedure Laterality Date    HX CARPAL TUNNEL RELEASE Right     HX PARTIAL HYSTERECTOMY         Family History:  Family History   Problem Relation Age of Onset    Hypertension Mother     Other Neg Hx         high calcium or kidney stones       Social History:  Social History     Tobacco Use    Smoking status: Former Smoker     Packs/day: 0.50     Years: 10.00     Pack years: 5.00     Quit date: 1988     Years since quittin.2    Smokeless tobacco: Never Used   Substance Use Topics    Alcohol use: No     Alcohol/week: 0.0 standard drinks    Drug use: No       Allergies: Allergies   Allergen Reactions    Hydrochlorothiazide Other (comments)     Will avoid as she has hyperparathyroidism to not make calcium levels worse         Review of Systems   Review of Systems   Constitutional: Negative for chills, diaphoresis, fatigue and fever. HENT: Negative for ear pain and sore throat. Eyes: Negative for pain and redness. Respiratory: Negative for cough and shortness of breath. Cardiovascular: Negative for chest pain and leg swelling. Gastrointestinal: Negative for abdominal pain, diarrhea, nausea and vomiting. Endocrine: Negative for cold intolerance and heat intolerance. Genitourinary: Negative for flank pain and hematuria. Musculoskeletal: Positive for arthralgias. Negative for back pain and neck stiffness. Skin: Negative for rash and wound. Neurological: Negative for dizziness, syncope and headaches. All other systems reviewed and are negative. Physical Exam   Physical Exam  Vitals signs and nursing note reviewed. Constitutional:       Appearance: She is well-developed. HENT:      Head: Normocephalic and atraumatic. Mouth/Throat:      Pharynx: No oropharyngeal exudate. Eyes:      Conjunctiva/sclera: Conjunctivae normal.      Pupils: Pupils are equal, round, and reactive to light. Neck:      Musculoskeletal: Normal range of motion. Cardiovascular:      Rate and Rhythm: Normal rate and regular rhythm. Heart sounds: No murmur.    Pulmonary:      Effort: Pulmonary effort is normal. No respiratory distress. Breath sounds: Normal breath sounds. No wheezing. Abdominal:      General: Bowel sounds are normal. There is no distension. Palpations: Abdomen is soft. Tenderness: There is no abdominal tenderness. Musculoskeletal:         General: No deformity. Right ankle: She exhibits decreased range of motion and swelling. She exhibits no ecchymosis and no deformity. Comments: Mild swelling to right ankle with non-pitting edema in RLE. No redness or pain to the touch. Compartments soft, good distal pulses, NVI throughout lower extremity. Skin:     General: Skin is warm and dry. Findings: No rash. Neurological:      Mental Status: She is alert and oriented to person, place, and time. Coordination: Coordination normal.   Psychiatric:         Behavior: Behavior normal.         Diagnostic Study Results     Labs -   No results found for this or any previous visit (from the past 24 hour(s)). Recent Results (from the past 168 hour(s))   CBC WITH AUTOMATED DIFF    Collection Time: 04/01/21 11:26 AM   Result Value Ref Range    WBC 4.7 3.6 - 11.0 K/uL    RBC 4.02 3.80 - 5.20 M/uL    HGB 11.8 11.5 - 16.0 g/dL    HCT 38.1 35.0 - 47.0 %    MCV 94.8 80.0 - 99.0 FL    MCH 29.4 26.0 - 34.0 PG    MCHC 31.0 30.0 - 36.5 g/dL    RDW 13.5 11.5 - 14.5 %    PLATELET 820 414 - 065 K/uL    MPV 10.5 8.9 - 12.9 FL    NRBC 0.0 0  WBC    ABSOLUTE NRBC 0.00 0.00 - 0.01 K/uL    NEUTROPHILS 72 32 - 75 %    LYMPHOCYTES 22 12 - 49 %    MONOCYTES 5 5 - 13 %    EOSINOPHILS 1 0 - 7 %    BASOPHILS 0 0 - 1 %    IMMATURE GRANULOCYTES 0 0.0 - 0.5 %    ABS. NEUTROPHILS 3.3 1.8 - 8.0 K/UL    ABS. LYMPHOCYTES 1.0 0.8 - 3.5 K/UL    ABS. MONOCYTES 0.2 0.0 - 1.0 K/UL    ABS. EOSINOPHILS 0.0 0.0 - 0.4 K/UL    ABS. BASOPHILS 0.0 0.0 - 0.1 K/UL    ABS. IMM.  GRANS. 0.0 0.00 - 0.04 K/UL    DF AUTOMATED     METABOLIC PANEL, COMPREHENSIVE    Collection Time: 04/01/21 11:26 AM   Result Value Ref Range    Sodium 142 136 - 145 mmol/L    Potassium 3.8 3.5 - 5.1 mmol/L    Chloride 109 (H) 97 - 108 mmol/L    CO2 27 21 - 32 mmol/L    Anion gap 6 5 - 15 mmol/L    Glucose 100 65 - 100 mg/dL    BUN 12 6 - 20 MG/DL    Creatinine 0.91 0.55 - 1.02 MG/DL    BUN/Creatinine ratio 13 12 - 20      GFR est AA >60 >60 ml/min/1.73m2    GFR est non-AA 59 (L) >60 ml/min/1.73m2    Calcium 10.1 8.5 - 10.1 MG/DL    Bilirubin, total 0.6 0.2 - 1.0 MG/DL    ALT (SGPT) 15 12 - 78 U/L    AST (SGOT) 18 15 - 37 U/L    Alk. phosphatase 74 45 - 117 U/L    Protein, total 7.6 6.4 - 8.2 g/dL    Albumin 3.5 3.5 - 5.0 g/dL    Globulin 4.1 (H) 2.0 - 4.0 g/dL    A-G Ratio 0.9 (L) 1.1 - 2.2         Radiologic Studies -   XR ANKLE RT MIN 3 V   Final Result   No acute fracture or dislocation. CT Results  (Last 48 hours)    None        CXR Results  (Last 48 hours)    None            Medical Decision Making   I am the first provider for this patient. I reviewed the vital signs, available nursing notes, past medical history, past surgical history, family history and social history. Vital Signs-Reviewed the patient's vital signs. No data found. Vitals:    04/01/21 1230 04/01/21 1300 04/01/21 1400 04/01/21 1500   BP: (!) 122/50 (!) 114/50 (!) 123/50 (!) 118/52   Pulse: (!) 57 (!) 57 (!) 54 (!) 59   Resp: 17 18 17 21   Temp:       SpO2: 100% 98% 94% 94%   Weight:       Height:           Records Reviewed: Nursing records and medical records reviewed    MDM:      Provider Notes (Medical Decision Making):   81 yo female with atraumatic swelling/pain to right ankle. Neg duplex, x-ray unremarkable, lab markers not c/w septic arthritis, nor is clinical exam.  Will tx symptomatically with close outpatient follow-up with ortho. ED Course:   Initial assessment performed. The patients presenting problems have been discussed, and they are in agreement with the care plan formulated and outlined with them.   I have encouraged them to ask questions as they arise throughout their visit. Medications Administered     acetaminophen (TYLENOL) tablet 1,000 mg     Admin Date  04/01/2021 Action  Given Dose  1,000 mg Route  Oral Administered By  Joel Brooke RN          ibuprofen (MOTRIN) tablet 400 mg     Admin Date  04/01/2021 Action  Given Dose  400 mg Route  Oral Administered By  Joel Brooke RN                    Critical Care:  none      Disposition:  Discharge Note:  7:39 AM  The patient has been re-evaluated and is ready for discharge. Reviewed available results with patient. Counseled patient on diagnosis and care plan. Patient has expressed understanding, and all questions have been answered. Patient agrees with plan and agrees to follow up as recommended, or to return to the ED if their symptoms worsen. Discharge instructions have been provided and explained to the patient, along with reasons to return to the ED. DISCHARGE PLAN:  1. Discharge Medication List as of 4/1/2021  3:16 PM      START taking these medications    Details   acetaminophen (TYLENOL) 325 mg tablet Take 2 Tabs by mouth every four (4) hours as needed for Pain., Normal, Disp-20 Tab, R-0         CONTINUE these medications which have CHANGED    Details   ibuprofen (MOTRIN) 400 mg tablet Take 1 Tab by mouth every eight (8) hours as needed for Pain., Normal, Disp-30 Tab, R-0         CONTINUE these medications which have NOT CHANGED    Details   metoprolol succinate (TOPROL-XL) 100 mg tablet TAKE 1 TABLET BY MOUTH ONCE DAILY--FUTURE REFILLS TO DR SCOTT MENDOZA, Normal, Disp-90 Tab, R-0      irbesartan (AVAPRO) 300 mg tablet Take 300 mg by mouth daily. , Historical Med      amLODIPine (NORVASC) 2.5 mg tablet Take 1 Tab by mouth daily. , Normal, Disp-90 Tab, R-3      furosemide (LASIX) 20 mg tablet Take 1 tab as needed for fluid. No more than 3 times per week, Normal, Disp-40 Tab, R-3      potassium chloride 20 mEq TbER Take 1 tab daily, Historical Med           2. Follow-up Information     Follow up With Specialties Details Why Contact Info    Josh Lopez, DO Orthopedic Surgery In 3 days For a follow-up evaluation. [address] [facility]  [phone]      [facility] EMERGENCY DEPT Emergency Medicine In 1 day If symptoms worsen. PLEASE TAKE BOTH TYLENOL AND IBUPROFEN FOR PAIN CONTROL AS PRESCRIBED,. [address] [place] [address]  [phone]        3. Return to ED if worse     Diagnosis     Clinical Impression:   1. Monoarticular arthritis        Attestations:    Rafael Alicea MD    Please note that this dictation was completed with [employer], the computer voice recognition software. Quite often unanticipated grammatical, syntax, homophones, and other interpretive errors are inadvertently transcribed by the computer software. Please disregard these errors. Please excuse any errors that have escaped final proofreading. Thank you.

## 2021-04-12 DIAGNOSIS — M25.551 RIGHT HIP PAIN: Primary | ICD-10-CM

## 2021-04-14 ENCOUNTER — OFFICE VISIT (OUTPATIENT)
Dept: ORTHOPEDIC SURGERY | Age: 86
End: 2021-04-14
Payer: MEDICARE

## 2021-04-14 ENCOUNTER — HOSPITAL ENCOUNTER (OUTPATIENT)
Dept: GENERAL RADIOLOGY | Age: 86
Discharge: HOME | End: 2021-04-14
Payer: MEDICARE

## 2021-04-14 VITALS
HEART RATE: 62 BPM | SYSTOLIC BLOOD PRESSURE: 130 MMHG | WEIGHT: 154 LBS | DIASTOLIC BLOOD PRESSURE: 72 MMHG | TEMPERATURE: 98.2 F | HEIGHT: 63 IN | OXYGEN SATURATION: 100 % | BODY MASS INDEX: 27.29 KG/M2

## 2021-04-14 DIAGNOSIS — M16.0 BILATERAL PRIMARY OSTEOARTHRITIS OF HIP: Primary | ICD-10-CM

## 2021-04-14 DIAGNOSIS — M25.551 RIGHT HIP PAIN: ICD-10-CM

## 2021-04-14 PROCEDURE — G8754 DIAS BP LESS 90: HCPCS | Performed by: ORTHOPAEDIC SURGERY

## 2021-04-14 PROCEDURE — G8419 CALC BMI OUT NRM PARAM NOF/U: HCPCS | Performed by: ORTHOPAEDIC SURGERY

## 2021-04-14 PROCEDURE — G8536 NO DOC ELDER MAL SCRN: HCPCS | Performed by: ORTHOPAEDIC SURGERY

## 2021-04-14 PROCEDURE — 1101F PT FALLS ASSESS-DOCD LE1/YR: CPT | Performed by: ORTHOPAEDIC SURGERY

## 2021-04-14 PROCEDURE — G8400 PT W/DXA NO RESULTS DOC: HCPCS | Performed by: ORTHOPAEDIC SURGERY

## 2021-04-14 PROCEDURE — G8510 SCR DEP NEG, NO PLAN REQD: HCPCS | Performed by: ORTHOPAEDIC SURGERY

## 2021-04-14 PROCEDURE — 1090F PRES/ABSN URINE INCON ASSESS: CPT | Performed by: ORTHOPAEDIC SURGERY

## 2021-04-14 PROCEDURE — G8427 DOCREV CUR MEDS BY ELIG CLIN: HCPCS | Performed by: ORTHOPAEDIC SURGERY

## 2021-04-14 PROCEDURE — 99204 OFFICE O/P NEW MOD 45 MIN: CPT | Performed by: ORTHOPAEDIC SURGERY

## 2021-04-14 PROCEDURE — 73502 X-RAY EXAM HIP UNI 2-3 VIEWS: CPT

## 2021-04-14 PROCEDURE — G8752 SYS BP LESS 140: HCPCS | Performed by: ORTHOPAEDIC SURGERY

## 2021-04-14 NOTE — PROGRESS NOTES
Identified pt with two pt identifiers (name and ). Reviewed chart in preparation for visit and have obtained necessary documentation. Evie Morris is a 82 y.o. female  Chief Complaint   Patient presents with   [facility] ED Follow-up     RT ankle, RT hip     Visit Vitals  /72 (BP 1 Location: Right arm, BP Patient Position: Sitting, BP Cuff Size: Large adult)   Pulse 62   Temp 98.2 °F (36.8 °C) (Tympanic)   Ht 5' 3\" (1.6 m)   Wt 154 lb (69.9 kg)   SpO2 100%   BMI 27.28 kg/m²     1. Have you been to the ER, urgent care clinic since your last visit? Hospitalized since your last visit? Yes Where: ED [facility]    2. Have you seen or consulted any other health care providers outside of the [address] [place] [address] since your last visit? Include any pap smears or colon screening.  No

## 2021-04-14 NOTE — PROGRESS NOTES
4/14/2021    Chief Complaint: Right hip pain    HPI: This is a 82 y. o.female who complains of right hip pain which is activity dependent. The patient has had activity dependent pain for several months. The patient has tried activity modification, she has tried physical therapy, injections have not been attemped. The pain is in the groin and down the thigh, it is severe in intensity. The patient fears falling, walks with a limp, and feels as though all nonoperative management has failed. The patient denies any numbness, weakness, tingling, fevers, chills, nausea, vomiting, fevers, chills, nausea, vomiting. Past Medical History:   Diagnosis Date    DJD (degenerative joint disease)     Hypercalcemia     Hypertension     Vitamin D deficiency        Past Surgical History:   Procedure Laterality Date    HX CARPAL TUNNEL RELEASE Right     HX PARTIAL HYSTERECTOMY         Current Outpatient Medications on File Prior to Visit   Medication Sig Dispense Refill    ibuprofen (MOTRIN) 400 mg tablet Take 1 Tab by mouth every eight (8) hours as needed for Pain. 30 Tab 0    acetaminophen (TYLENOL) 325 mg tablet Take 2 Tabs by mouth every four (4) hours as needed for Pain. 20 Tab 0    metoprolol succinate (TOPROL-XL) 100 mg tablet TAKE 1 TABLET BY MOUTH ONCE DAILY--FUTURE REFILLS TO DR SCOTT MENDOZA 90 Tab 0    irbesartan (AVAPRO) 300 mg tablet Take 300 mg by mouth daily.  amLODIPine (NORVASC) 2.5 mg tablet Take 1 Tab by mouth daily. 90 Tab 3    furosemide (LASIX) 20 mg tablet Take 1 tab as needed for fluid. No more than 3 times per week 40 Tab 3    potassium chloride 20 mEq TbER Take 1 tab daily       No current facility-administered medications on file prior to visit.         Allergies   Allergen Reactions    Hydrochlorothiazide Other (comments)     Will avoid as she has hyperparathyroidism to not make calcium levels worse       Family History   Problem Relation Age of Onset    Hypertension Mother    [employer]Ask Other Neg Hx         high calcium or kidney stones       Social History     Socioeconomic History    Marital status:      Spouse name: Not on file    Number of children: 2    Years of education: unknown    Highest education level: Not on file   Occupational History    Occupation:      Employer: [employer]    Smoking status: Former Smoker     Packs/day: 0.50     Years: 10.00     Pack years: 5.00     Quit date: 1988     Years since quittin.3    Smokeless tobacco: Never Used   Substance and Sexual Activity    Alcohol use: No     Alcohol/week: 0.0 standard drinks    Drug use: No   Other Topics Concern    Caffeine Concern No    Occupational Exposure No    Hobby Hazards No    Weight Concern No     Comment: concerned about her weight gain   Social History Narrative    Lives in [place] alone. . Had 3 sons and lost one at age 25 in 12. 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 Likes to travel. Review of Systems:       General: Denies headache, lethargy, fever, weight loss  Ears/Nose/Throat: Denies ear discharge, drainage, nosebleeds, hoarse voice, dental problems  Cardiovascular: Denies chest pain, shortness of breath  Lungs: Denies chest pain, breathing problems, wheezing, pneumonia  Stomach: Denies stomach pain, heartburn, constipation, irritable bowel  Skin: Denies rash, sores, open wounds  Musculoskeletal: Admits to joint pain and swelling, this pain is sharp and causes difficulty walking. This pain is in the groin on the right side, it is severe, made better by rest.   This pain causes a limp, admits to stiffness of the joint, decreased mobility, and  difficulty doing normal activities of daily living secondary to the pain. Genitourinary: Denies dysuria, hematuria, polyuria  Gastrointestinal: Denies constipation, obstipation, diarrhea  Neurological: Denies changes in sight, smell, hearing, taste, seizures.  Denies loss of consciousness. Psychiatric: Denies depression, sleep pattern changes, anxiety, change in personality  Endocrine: Denies mood swings, heat or cold intolerance  Hematologic/Lymphatic: Denies anemia, purpura, petechia  Allergic/Immunologic: Denies swelling of throat, pain or swelling at lymph nodes      Physical Examination:      General: AOX3, no apparent distress  Psychiatric: mood and affect appropriate  Lungs: clear to auscultation bilaterally  Heart: regular rate and rhythm  Abdomen: no guarding  Head: normocephalic, atraumatic  Skin: No significant abnormalities, good turgor  Sensation intact to light touch: L1-S1 dermatomes  Muscular exam: 5/5 strength in all major muscle groups unless noted in specialty exam.    Extremities      Left upper extremity: Full active and passive range of motion without pain, deformity, no open wound, strength 5/5 in all major muscle groups. Right upper extremity: Full active and passive range of motion without pain, deformity, no open wound, strength 5/5 in all major muscle groups. Left lower extremity: Full active and passive range of motion without pain, deformity, no open wound, strength 5/5 in all major muscle groups. Right lower extremity:  No deformity is noted. Circumduction of the hip causes significant pain in the groin, reproductive of the chief complaint. Range of motion is limited, with a positive impingement sign. GRACE test causes some pain in the groin. Gait is antalgic. Slight tenderness to palpation on the lateral aspect of the hip. Sensation is intact to light touch in the L1-S1 dermatomes. Skin is intact about the hip. Hip flexion and abduction strength is 4+/5, hip extension and knee extension as well as tibialis anterior and EHL/GCS are 5/5 strength. Diagnostics:    Pertinent Xrays:  Pelvis and hip xrays indicate severe osteoarthritis of the right hip, moderate on the left.    There are osteophytes at the femoral neck and head, as well as subchondral sclerosis of the acetabular rim. 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 Nonoperative management was discussed at length, continued pain control, physical therapy, injections, ambulatory aids, and weight loss. I did speak with the patient specifically that there are always some nonoperative options, and that surgery would be elective on their part. We did discuss the risks of surgery which include but are not limited to infection, nerve or blood vessel damage, femoral, lateral femoral cutaneous nerve injury, sciatic nerve injury, failure of fixation, failure of any possible implant, need for reoperation, postoperative pain and swelling, intra-or postoperative fracture, postoperative dislocation, leg length inequality, need for reoperation, implant failure, death, disability, organ dysfunction, wound healing issues, DVT, PE, and the need for further procedures. The patient did freely state their understanding and satisfaction with our discussion. She will discuss with her family and we will proceed based on her desires. Ms. Davee Buerger has a reminder for a \"due or due soon\" health maintenance. I have asked that she contact her primary care provider for follow-up on this health maintenance.

## 2021-04-16 ENCOUNTER — TELEPHONE (OUTPATIENT)
Dept: ORTHOPEDIC SURGERY | Age: 86
End: 2021-04-16

## 2021-04-16 NOTE — TELEPHONE ENCOUNTER
Pt's son Andrea Reynoso, would like a phone call from you, when you get a minute to discuss procedure options for his mother(pt). Germaine's phone number is [phone] and he is on pt's HIPAA.

## 2022-03-19 PROBLEM — M16.0 BILATERAL PRIMARY OSTEOARTHRITIS OF HIP: Status: ACTIVE | Noted: 2021-04-14

## 2023-05-10 RX ORDER — AMLODIPINE BESYLATE 2.5 MG/1
TABLET ORAL DAILY
Status: ON HOLD | COMMUNITY
Start: 2017-06-16 | End: 2023-06-27 | Stop reason: ALTCHOICE

## 2023-05-10 RX ORDER — POTASSIUM CHLORIDE 20 MEQ/1
TABLET, EXTENDED RELEASE ORAL
Status: ON HOLD | COMMUNITY
Start: 2016-06-22 | End: 2023-06-27 | Stop reason: ALTCHOICE

## 2023-05-10 RX ORDER — ACETAMINOPHEN 325 MG/1
TABLET ORAL EVERY 4 HOURS PRN
COMMUNITY
Start: 2021-04-01

## 2023-05-10 RX ORDER — IRBESARTAN 300 MG/1
300 TABLET ORAL DAILY
Status: ON HOLD | COMMUNITY
Start: 2018-11-28 | End: 2023-06-26 | Stop reason: ALTCHOICE

## 2023-05-10 RX ORDER — METOPROLOL SUCCINATE 100 MG/1
TABLET, EXTENDED RELEASE ORAL
Status: ON HOLD | COMMUNITY
Start: 2019-12-20 | End: 2023-06-27 | Stop reason: ALTCHOICE

## 2023-05-10 RX ORDER — IBUPROFEN 400 MG/1
TABLET ORAL EVERY 8 HOURS PRN
Status: ON HOLD | COMMUNITY
Start: 2021-04-01 | End: 2023-07-05 | Stop reason: HOSPADM

## 2023-05-10 RX ORDER — FUROSEMIDE 20 MG/1
TABLET ORAL
Status: ON HOLD | COMMUNITY
Start: 2017-06-16 | End: 2023-06-27 | Stop reason: ALTCHOICE

## 2023-06-25 ENCOUNTER — APPOINTMENT (OUTPATIENT)
Facility: HOSPITAL | Age: 88
DRG: 064 | End: 2023-06-25
Payer: MEDICARE

## 2023-06-25 ENCOUNTER — HOSPITAL ENCOUNTER (INPATIENT)
Facility: HOSPITAL | Age: 88
LOS: 10 days | Discharge: NURSING, REHAB OR LONG-TERM CARE | DRG: 064 | End: 2023-07-06
Attending: EMERGENCY MEDICINE | Admitting: INTERNAL MEDICINE
Payer: MEDICARE

## 2023-06-25 DIAGNOSIS — J90 BILATERAL PLEURAL EFFUSION: ICD-10-CM

## 2023-06-25 DIAGNOSIS — J69.0 ASPIRATION PNEUMONIA, UNSPECIFIED ASPIRATION PNEUMONIA TYPE, UNSPECIFIED LATERALITY, UNSPECIFIED PART OF LUNG (HCC): ICD-10-CM

## 2023-06-25 DIAGNOSIS — R06.03 ACUTE RESPIRATORY DISTRESS: ICD-10-CM

## 2023-06-25 DIAGNOSIS — I63.512 ACUTE ISCHEMIC LEFT MCA STROKE (HCC): ICD-10-CM

## 2023-06-25 DIAGNOSIS — J18.9 PNEUMONIA OF BOTH LOWER LOBES DUE TO INFECTIOUS ORGANISM: ICD-10-CM

## 2023-06-25 DIAGNOSIS — I63.9 CEREBROVASCULAR ACCIDENT (CVA), UNSPECIFIED MECHANISM (HCC): ICD-10-CM

## 2023-06-25 DIAGNOSIS — I50.9 ACUTE ON CHRONIC CONGESTIVE HEART FAILURE, UNSPECIFIED HEART FAILURE TYPE (HCC): Primary | ICD-10-CM

## 2023-06-25 DIAGNOSIS — I63.9 ACUTE CVA (CEREBROVASCULAR ACCIDENT) (HCC): ICD-10-CM

## 2023-06-25 DIAGNOSIS — G93.41 ACUTE METABOLIC ENCEPHALOPATHY: ICD-10-CM

## 2023-06-25 DIAGNOSIS — J96.01 ACUTE RESPIRATORY FAILURE WITH HYPOXIA (HCC): ICD-10-CM

## 2023-06-25 LAB
ALBUMIN SERPL-MCNC: 3.4 G/DL (ref 3.5–5)
ALBUMIN/GLOB SERPL: 1 (ref 1.1–2.2)
ALP SERPL-CCNC: 197 U/L (ref 45–117)
ALT SERPL-CCNC: 123 U/L (ref 12–78)
ANION GAP BLD CALC-SCNC: 10 (ref 10–20)
ANION GAP SERPL CALC-SCNC: 4 MMOL/L (ref 5–15)
ARTERIAL PATENCY WRIST A: POSITIVE
AST SERPL-CCNC: 82 U/L (ref 15–37)
BASE DEFICIT BLD-SCNC: 1.1 MMOL/L
BASE DEFICIT BLD-SCNC: 4.1 MMOL/L
BASOPHILS # BLD: 0 K/UL (ref 0–0.1)
BASOPHILS NFR BLD: 1 % (ref 0–1)
BDY SITE: ABNORMAL
BILIRUB SERPL-MCNC: 0.5 MG/DL (ref 0.2–1)
BUN SERPL-MCNC: 22 MG/DL (ref 6–20)
BUN/CREAT SERPL: 24 (ref 12–20)
CA-I BLD-MCNC: 1.35 MMOL/L (ref 1.12–1.32)
CALCIUM SERPL-MCNC: 9.9 MG/DL (ref 8.5–10.1)
CHLORIDE BLD-SCNC: 114 MMOL/L (ref 100–108)
CHLORIDE SERPL-SCNC: 115 MMOL/L (ref 97–108)
CO2 BLD-SCNC: 24 MMOL/L (ref 19–24)
CO2 SERPL-SCNC: 25 MMOL/L (ref 21–32)
COMMENT:: NORMAL
COMMENT:: NORMAL
CREAT SERPL-MCNC: 0.93 MG/DL (ref 0.55–1.02)
CREAT UR-MCNC: 1 MG/DL (ref 0.6–1.3)
DIFFERENTIAL METHOD BLD: ABNORMAL
EOSINOPHIL # BLD: 0.1 K/UL (ref 0–0.4)
EOSINOPHIL NFR BLD: 1 % (ref 0–7)
ERYTHROCYTE [DISTWIDTH] IN BLOOD BY AUTOMATED COUNT: 14.9 % (ref 11.5–14.5)
ETHANOL SERPL-MCNC: <10 MG/DL (ref 0–0.08)
GAS FLOW.O2 O2 DELIVERY SYS: ABNORMAL
GLOBULIN SER CALC-MCNC: 3.5 G/DL (ref 2–4)
GLUCOSE BLD STRIP.AUTO-MCNC: 115 MG/DL (ref 74–106)
GLUCOSE SERPL-MCNC: 124 MG/DL (ref 65–100)
HCO3 BLD-SCNC: 22.5 MMOL/L (ref 22–26)
HCO3 BLDA-SCNC: 24 MMOL/L
HCT VFR BLD AUTO: 41.4 % (ref 35–47)
HGB BLD-MCNC: 12.8 G/DL (ref 11.5–16)
IMM GRANULOCYTES # BLD AUTO: 0 K/UL (ref 0–0.04)
IMM GRANULOCYTES NFR BLD AUTO: 0 % (ref 0–0.5)
INR PPP: 1.2 (ref 0.9–1.1)
LACTATE BLD-SCNC: 1.01 MMOL/L (ref 0.4–2)
LYMPHOCYTES # BLD: 1.8 K/UL (ref 0.8–3.5)
LYMPHOCYTES NFR BLD: 32 % (ref 12–49)
MAGNESIUM SERPL-MCNC: 2.1 MG/DL (ref 1.6–2.4)
MCH RBC QN AUTO: 28.8 PG (ref 26–34)
MCHC RBC AUTO-ENTMCNC: 30.9 G/DL (ref 30–36.5)
MCV RBC AUTO: 93.2 FL (ref 80–99)
MONOCYTES # BLD: 0.3 K/UL (ref 0–1)
MONOCYTES NFR BLD: 5 % (ref 5–13)
NEUTS SEG # BLD: 3.5 K/UL (ref 1.8–8)
NEUTS SEG NFR BLD: 61 % (ref 32–75)
NRBC # BLD: 0 K/UL (ref 0–0.01)
NRBC BLD-RTO: 0 PER 100 WBC
NT PRO BNP: 9983 PG/ML
O2/TOTAL GAS SETTING VFR VENT: 21 %
PCO2 BLD: 45.8 MMHG (ref 35–45)
PCO2 BLDV: 39.9 MMHG (ref 41–51)
PH BLD: 7.3 (ref 7.35–7.45)
PH BLDV: 7.39 (ref 7.32–7.42)
PLATELET # BLD AUTO: 217 K/UL (ref 150–400)
PMV BLD AUTO: 10.7 FL (ref 8.9–12.9)
PO2 BLD: 41 MMHG (ref 80–100)
PO2 BLDV: 30 MMHG (ref 25–40)
POTASSIUM BLD-SCNC: 3.6 MMOL/L (ref 3.5–5.5)
POTASSIUM SERPL-SCNC: 3.7 MMOL/L (ref 3.5–5.1)
PROT SERPL-MCNC: 6.9 G/DL (ref 6.4–8.2)
PROTHROMBIN TIME: 11.9 SEC (ref 9–11.1)
RBC # BLD AUTO: 4.44 M/UL (ref 3.8–5.2)
SAO2 % BLD: 57 %
SAO2 % BLD: 71.1 % (ref 92–97)
SERVICE CMNT-IMP: ABNORMAL
SODIUM BLD-SCNC: 148 MMOL/L (ref 136–145)
SODIUM SERPL-SCNC: 144 MMOL/L (ref 136–145)
SPECIMEN HOLD: NORMAL
SPECIMEN HOLD: NORMAL
SPECIMEN HOLD: YELLOW
SPECIMEN SITE: ABNORMAL
SPECIMEN TYPE: ABNORMAL
TROPONIN I SERPL HS-MCNC: 44 NG/L (ref 0–51)
WBC # BLD AUTO: 5.7 K/UL (ref 3.6–11)

## 2023-06-25 PROCEDURE — 84295 ASSAY OF SERUM SODIUM: CPT

## 2023-06-25 PROCEDURE — 83880 ASSAY OF NATRIURETIC PEPTIDE: CPT

## 2023-06-25 PROCEDURE — 82330 ASSAY OF CALCIUM: CPT

## 2023-06-25 PROCEDURE — 70498 CT ANGIOGRAPHY NECK: CPT

## 2023-06-25 PROCEDURE — 85610 PROTHROMBIN TIME: CPT

## 2023-06-25 PROCEDURE — 96365 THER/PROPH/DIAG IV INF INIT: CPT

## 2023-06-25 PROCEDURE — 82803 BLOOD GASES ANY COMBINATION: CPT

## 2023-06-25 PROCEDURE — 87636 SARSCOV2 & INF A&B AMP PRB: CPT

## 2023-06-25 PROCEDURE — 83735 ASSAY OF MAGNESIUM: CPT

## 2023-06-25 PROCEDURE — 87040 BLOOD CULTURE FOR BACTERIA: CPT

## 2023-06-25 PROCEDURE — 70450 CT HEAD/BRAIN W/O DYE: CPT

## 2023-06-25 PROCEDURE — 2700000000 HC OXYGEN THERAPY PER DAY

## 2023-06-25 PROCEDURE — 85025 COMPLETE CBC W/AUTO DIFF WBC: CPT

## 2023-06-25 PROCEDURE — 80053 COMPREHEN METABOLIC PANEL: CPT

## 2023-06-25 PROCEDURE — 6360000004 HC RX CONTRAST MEDICATION

## 2023-06-25 PROCEDURE — 84484 ASSAY OF TROPONIN QUANT: CPT

## 2023-06-25 PROCEDURE — 36600 WITHDRAWAL OF ARTERIAL BLOOD: CPT

## 2023-06-25 PROCEDURE — 99285 EMERGENCY DEPT VISIT HI MDM: CPT

## 2023-06-25 PROCEDURE — 2500000003 HC RX 250 WO HCPCS: Performed by: EMERGENCY MEDICINE

## 2023-06-25 PROCEDURE — 82947 ASSAY GLUCOSE BLOOD QUANT: CPT

## 2023-06-25 PROCEDURE — 6360000002 HC RX W HCPCS: Performed by: EMERGENCY MEDICINE

## 2023-06-25 PROCEDURE — 71045 X-RAY EXAM CHEST 1 VIEW: CPT

## 2023-06-25 PROCEDURE — 82077 ASSAY SPEC XCP UR&BREATH IA: CPT

## 2023-06-25 PROCEDURE — 0042T CT BRAIN PERFUSION: CPT

## 2023-06-25 PROCEDURE — 84132 ASSAY OF SERUM POTASSIUM: CPT

## 2023-06-25 PROCEDURE — 96375 TX/PRO/DX INJ NEW DRUG ADDON: CPT

## 2023-06-25 PROCEDURE — 36415 COLL VENOUS BLD VENIPUNCTURE: CPT

## 2023-06-25 RX ORDER — SODIUM CHLORIDE 9 MG/ML
INJECTION, SOLUTION INTRAVENOUS PRN
Status: DISCONTINUED | OUTPATIENT
Start: 2023-06-25 | End: 2023-06-25

## 2023-06-25 RX ORDER — SODIUM CHLORIDE 0.9 % (FLUSH) 0.9 %
10 SYRINGE (ML) INJECTION ONCE
Status: DISCONTINUED | OUTPATIENT
Start: 2023-06-25 | End: 2023-06-25

## 2023-06-25 RX ORDER — LEVALBUTEROL INHALATION SOLUTION 1.25 MG/3ML
1.25 SOLUTION RESPIRATORY (INHALATION)
Status: COMPLETED | OUTPATIENT
Start: 2023-06-25 | End: 2023-06-26

## 2023-06-25 RX ORDER — NITROGLYCERIN 20 MG/100ML
5-200 INJECTION INTRAVENOUS CONTINUOUS
Status: DISCONTINUED | OUTPATIENT
Start: 2023-06-25 | End: 2023-06-25

## 2023-06-25 RX ORDER — SODIUM CHLORIDE 0.9 % (FLUSH) 0.9 %
5-40 SYRINGE (ML) INJECTION PRN
Status: DISCONTINUED | OUTPATIENT
Start: 2023-06-25 | End: 2023-06-25

## 2023-06-25 RX ORDER — FUROSEMIDE 10 MG/ML
40 INJECTION INTRAMUSCULAR; INTRAVENOUS ONCE
Status: COMPLETED | OUTPATIENT
Start: 2023-06-25 | End: 2023-06-25

## 2023-06-25 RX ORDER — LORAZEPAM 2 MG/ML
1 INJECTION INTRAMUSCULAR ONCE
Status: COMPLETED | OUTPATIENT
Start: 2023-06-25 | End: 2023-06-26

## 2023-06-25 RX ORDER — SODIUM CHLORIDE 0.9 % (FLUSH) 0.9 %
5-40 SYRINGE (ML) INJECTION EVERY 12 HOURS SCHEDULED
Status: DISCONTINUED | OUTPATIENT
Start: 2023-06-25 | End: 2023-07-06 | Stop reason: HOSPADM

## 2023-06-25 RX ORDER — DEXTROSE MONOHYDRATE 25 G/50ML
12.5 INJECTION, SOLUTION INTRAVENOUS
Status: DISCONTINUED | OUTPATIENT
Start: 2023-06-25 | End: 2023-06-25

## 2023-06-25 RX ADMIN — FUROSEMIDE 40 MG: 10 INJECTION, SOLUTION INTRAMUSCULAR; INTRAVENOUS at 22:55

## 2023-06-25 RX ADMIN — NITROGLYCERIN 5 MCG/MIN: 20 INJECTION INTRAVENOUS at 22:55

## 2023-06-25 RX ADMIN — IOPAMIDOL 100 ML: 755 INJECTION, SOLUTION INTRAVENOUS at 22:49

## 2023-06-25 ASSESSMENT — PAIN - FUNCTIONAL ASSESSMENT: PAIN_FUNCTIONAL_ASSESSMENT: NONE - DENIES PAIN

## 2023-06-26 ENCOUNTER — APPOINTMENT (OUTPATIENT)
Facility: HOSPITAL | Age: 88
DRG: 064 | End: 2023-06-26
Payer: MEDICARE

## 2023-06-26 PROBLEM — G93.41 ACUTE METABOLIC ENCEPHALOPATHY: Status: ACTIVE | Noted: 2023-06-26

## 2023-06-26 PROBLEM — I50.9 ACUTE ON CHRONIC CONGESTIVE HEART FAILURE (HCC): Status: ACTIVE | Noted: 2023-06-26

## 2023-06-26 PROBLEM — I63.9 ISCHEMIC STROKE (HCC): Status: ACTIVE | Noted: 2023-06-26

## 2023-06-26 PROBLEM — R53.1 ACUTE RIGHT-SIDED WEAKNESS: Status: ACTIVE | Noted: 2023-06-26

## 2023-06-26 LAB
ANION GAP SERPL CALC-SCNC: 5 MMOL/L (ref 5–15)
BUN SERPL-MCNC: 28 MG/DL (ref 6–20)
BUN/CREAT SERPL: 30 (ref 12–20)
CALCIUM SERPL-MCNC: 9 MG/DL (ref 8.5–10.1)
CHLORIDE SERPL-SCNC: 116 MMOL/L (ref 97–108)
CHOLEST SERPL-MCNC: 134 MG/DL
CO2 SERPL-SCNC: 25 MMOL/L (ref 21–32)
CREAT SERPL-MCNC: 0.92 MG/DL (ref 0.55–1.02)
ECHO AO ROOT DIAM: 3 CM
ECHO AO ROOT INDEX: 1.91 CM/M2
ECHO AV AREA PEAK VELOCITY: 2.6 CM2
ECHO AV AREA/BSA PEAK VELOCITY: 1.7 CM2/M2
ECHO AV PEAK GRADIENT: 6 MMHG
ECHO AV PEAK VELOCITY: 1.2 M/S
ECHO AV VELOCITY RATIO: 0.83
ECHO BSA: 1.6 M2
ECHO LA DIAMETER INDEX: 1.72 CM/M2
ECHO LA DIAMETER: 2.7 CM
ECHO LA TO AORTIC ROOT RATIO: 0.9
ECHO LV FRACTIONAL SHORTENING: 15 % (ref 28–44)
ECHO LV INTERNAL DIMENSION DIASTOLE INDEX: 2.99 CM/M2
ECHO LV INTERNAL DIMENSION DIASTOLIC: 4.7 CM (ref 3.9–5.3)
ECHO LV INTERNAL DIMENSION SYSTOLIC INDEX: 2.55 CM/M2
ECHO LV INTERNAL DIMENSION SYSTOLIC: 4 CM
ECHO LV IVSD: 1.6 CM (ref 0.6–0.9)
ECHO LV MASS 2D: 324.4 G (ref 67–162)
ECHO LV MASS INDEX 2D: 206.6 G/M2 (ref 43–95)
ECHO LV POSTERIOR WALL DIASTOLIC: 1.6 CM (ref 0.6–0.9)
ECHO LV RELATIVE WALL THICKNESS RATIO: 0.68
ECHO LVOT AREA: 3.1 CM2
ECHO LVOT DIAM: 2 CM
ECHO LVOT PEAK GRADIENT: 4 MMHG
ECHO LVOT PEAK VELOCITY: 1 M/S
ECHO TV REGURGITANT MAX VELOCITY: 2.34 M/S
ECHO TV REGURGITANT PEAK GRADIENT: 22 MMHG
EKG ATRIAL RATE: 131 BPM
EKG DIAGNOSIS: NORMAL
EKG P AXIS: 73 DEGREES
EKG P-R INTERVAL: 156 MS
EKG Q-T INTERVAL: 292 MS
EKG QRS DURATION: 112 MS
EKG QTC CALCULATION (BAZETT): 431 MS
EKG R AXIS: -59 DEGREES
EKG T AXIS: 89 DEGREES
EKG VENTRICULAR RATE: 131 BPM
ERYTHROCYTE [DISTWIDTH] IN BLOOD BY AUTOMATED COUNT: 14.6 % (ref 11.5–14.5)
EST. AVERAGE GLUCOSE BLD GHB EST-MCNC: 88 MG/DL
FLUAV RNA SPEC QL NAA+PROBE: NOT DETECTED
FLUBV RNA SPEC QL NAA+PROBE: NOT DETECTED
GLUCOSE SERPL-MCNC: 118 MG/DL (ref 65–100)
HBA1C MFR BLD: 4.7 % (ref 4–5.6)
HCT VFR BLD AUTO: 34.2 % (ref 35–47)
HDLC SERPL-MCNC: 50 MG/DL
HDLC SERPL: 2.7 (ref 0–5)
HGB BLD-MCNC: 10.8 G/DL (ref 11.5–16)
LDLC SERPL CALC-MCNC: 76 MG/DL (ref 0–100)
MAGNESIUM SERPL-MCNC: 1.9 MG/DL (ref 1.6–2.4)
MCH RBC QN AUTO: 29.6 PG (ref 26–34)
MCHC RBC AUTO-ENTMCNC: 31.6 G/DL (ref 30–36.5)
MCV RBC AUTO: 93.7 FL (ref 80–99)
NRBC # BLD: 0 K/UL (ref 0–0.01)
NRBC BLD-RTO: 0 PER 100 WBC
PHOSPHATE SERPL-MCNC: 3.7 MG/DL (ref 2.6–4.7)
PLATELET # BLD AUTO: 162 K/UL (ref 150–400)
PMV BLD AUTO: 11.2 FL (ref 8.9–12.9)
POTASSIUM SERPL-SCNC: 3.5 MMOL/L (ref 3.5–5.1)
PROCALCITONIN SERPL-MCNC: 0.23 NG/ML
RBC # BLD AUTO: 3.65 M/UL (ref 3.8–5.2)
SARS-COV-2 RNA RESP QL NAA+PROBE: NOT DETECTED
SODIUM SERPL-SCNC: 146 MMOL/L (ref 136–145)
TRIGL SERPL-MCNC: 40 MG/DL
VLDLC SERPL CALC-MCNC: 8 MG/DL
WBC # BLD AUTO: 6.3 K/UL (ref 3.6–11)

## 2023-06-26 PROCEDURE — 6370000000 HC RX 637 (ALT 250 FOR IP): Performed by: NURSE PRACTITIONER

## 2023-06-26 PROCEDURE — 99223 1ST HOSP IP/OBS HIGH 75: CPT | Performed by: PSYCHIATRY & NEUROLOGY

## 2023-06-26 PROCEDURE — 2580000003 HC RX 258: Performed by: NURSE PRACTITIONER

## 2023-06-26 PROCEDURE — 84145 PROCALCITONIN (PCT): CPT

## 2023-06-26 PROCEDURE — 95819 EEG AWAKE AND ASLEEP: CPT

## 2023-06-26 PROCEDURE — 4A03X5D MEASUREMENT OF ARTERIAL FLOW, INTRACRANIAL, EXTERNAL APPROACH: ICD-10-PCS | Performed by: INTERNAL MEDICINE

## 2023-06-26 PROCEDURE — 6360000002 HC RX W HCPCS: Performed by: INTERNAL MEDICINE

## 2023-06-26 PROCEDURE — 95816 EEG AWAKE AND DROWSY: CPT | Performed by: PSYCHIATRY & NEUROLOGY

## 2023-06-26 PROCEDURE — 96375 TX/PRO/DX INJ NEW DRUG ADDON: CPT

## 2023-06-26 PROCEDURE — 80048 BASIC METABOLIC PNL TOTAL CA: CPT

## 2023-06-26 PROCEDURE — 83735 ASSAY OF MAGNESIUM: CPT

## 2023-06-26 PROCEDURE — 2580000003 HC RX 258: Performed by: EMERGENCY MEDICINE

## 2023-06-26 PROCEDURE — 36415 COLL VENOUS BLD VENIPUNCTURE: CPT

## 2023-06-26 PROCEDURE — 6360000002 HC RX W HCPCS: Performed by: NURSE PRACTITIONER

## 2023-06-26 PROCEDURE — 70551 MRI BRAIN STEM W/O DYE: CPT

## 2023-06-26 PROCEDURE — 6360000002 HC RX W HCPCS: Performed by: EMERGENCY MEDICINE

## 2023-06-26 PROCEDURE — 80061 LIPID PANEL: CPT

## 2023-06-26 PROCEDURE — 93308 TTE F-UP OR LMTD: CPT

## 2023-06-26 PROCEDURE — 94660 CPAP INITIATION&MGMT: CPT

## 2023-06-26 PROCEDURE — 85027 COMPLETE CBC AUTOMATED: CPT

## 2023-06-26 PROCEDURE — 2700000000 HC OXYGEN THERAPY PER DAY

## 2023-06-26 PROCEDURE — 2100000000 HC CCU R&B

## 2023-06-26 PROCEDURE — 83036 HEMOGLOBIN GLYCOSYLATED A1C: CPT

## 2023-06-26 PROCEDURE — 5A09357 ASSISTANCE WITH RESPIRATORY VENTILATION, LESS THAN 24 CONSECUTIVE HOURS, CONTINUOUS POSITIVE AIRWAY PRESSURE: ICD-10-PCS | Performed by: INTERNAL MEDICINE

## 2023-06-26 PROCEDURE — 94640 AIRWAY INHALATION TREATMENT: CPT

## 2023-06-26 PROCEDURE — 2500000003 HC RX 250 WO HCPCS: Performed by: NURSE PRACTITIONER

## 2023-06-26 PROCEDURE — 84100 ASSAY OF PHOSPHORUS: CPT

## 2023-06-26 RX ORDER — ASPIRIN 300 MG/1
300 SUPPOSITORY RECTAL DAILY
Status: DISCONTINUED | OUTPATIENT
Start: 2023-06-26 | End: 2023-07-06 | Stop reason: HOSPADM

## 2023-06-26 RX ORDER — POLYETHYLENE GLYCOL 3350 17 G/17G
17 POWDER, FOR SOLUTION ORAL DAILY PRN
Status: DISCONTINUED | OUTPATIENT
Start: 2023-06-26 | End: 2023-07-06 | Stop reason: HOSPADM

## 2023-06-26 RX ORDER — ENOXAPARIN SODIUM 100 MG/ML
40 INJECTION SUBCUTANEOUS DAILY
Status: DISCONTINUED | OUTPATIENT
Start: 2023-06-26 | End: 2023-07-02

## 2023-06-26 RX ORDER — LABETALOL HYDROCHLORIDE 5 MG/ML
10 INJECTION, SOLUTION INTRAVENOUS EVERY 10 MIN PRN
Status: DISCONTINUED | OUTPATIENT
Start: 2023-06-26 | End: 2023-07-06 | Stop reason: HOSPADM

## 2023-06-26 RX ORDER — CASTOR OIL AND BALSAM, PERU 788; 87 MG/G; MG/G
OINTMENT TOPICAL 2 TIMES DAILY
Status: DISCONTINUED | OUTPATIENT
Start: 2023-06-26 | End: 2023-07-06 | Stop reason: HOSPADM

## 2023-06-26 RX ORDER — ASPIRIN 81 MG/1
81 TABLET ORAL DAILY
Status: DISCONTINUED | OUTPATIENT
Start: 2023-06-26 | End: 2023-07-06 | Stop reason: HOSPADM

## 2023-06-26 RX ORDER — VALSARTAN 160 MG/1
160 TABLET ORAL DAILY
COMMUNITY
Start: 2023-05-22

## 2023-06-26 RX ORDER — ONDANSETRON 4 MG/1
4 TABLET, ORALLY DISINTEGRATING ORAL EVERY 8 HOURS PRN
Status: DISCONTINUED | OUTPATIENT
Start: 2023-06-26 | End: 2023-07-06 | Stop reason: HOSPADM

## 2023-06-26 RX ORDER — ATORVASTATIN CALCIUM 40 MG/1
80 TABLET, FILM COATED ORAL NIGHTLY
Status: DISCONTINUED | OUTPATIENT
Start: 2023-06-26 | End: 2023-06-27

## 2023-06-26 RX ORDER — ONDANSETRON 2 MG/ML
4 INJECTION INTRAMUSCULAR; INTRAVENOUS EVERY 6 HOURS PRN
Status: DISCONTINUED | OUTPATIENT
Start: 2023-06-26 | End: 2023-07-06 | Stop reason: HOSPADM

## 2023-06-26 RX ADMIN — SODIUM CHLORIDE 1000 MG: 900 INJECTION INTRAVENOUS at 23:34

## 2023-06-26 RX ADMIN — DEXMEDETOMIDINE 0.2 MCG/KG/HR: 100 INJECTION, SOLUTION INTRAVENOUS at 02:14

## 2023-06-26 RX ADMIN — SODIUM CHLORIDE, PRESERVATIVE FREE 10 ML: 5 INJECTION INTRAVENOUS at 09:24

## 2023-06-26 RX ADMIN — LORAZEPAM 1 MG: 2 INJECTION INTRAMUSCULAR; INTRAVENOUS at 00:16

## 2023-06-26 RX ADMIN — SODIUM CHLORIDE, PRESERVATIVE FREE 10 ML: 5 INJECTION INTRAVENOUS at 21:44

## 2023-06-26 RX ADMIN — Medication: at 09:31

## 2023-06-26 RX ADMIN — LEVALBUTEROL HYDROCHLORIDE 1.25 MG: 1.25 SOLUTION RESPIRATORY (INHALATION) at 01:41

## 2023-06-26 RX ADMIN — AZITHROMYCIN DIHYDRATE 500 MG: 500 INJECTION, POWDER, LYOPHILIZED, FOR SOLUTION INTRAVENOUS at 01:10

## 2023-06-26 RX ADMIN — ASPIRIN 300 MG: 300 SUPPOSITORY RECTAL at 03:54

## 2023-06-26 RX ADMIN — Medication: at 21:48

## 2023-06-26 RX ADMIN — ENOXAPARIN SODIUM 40 MG: 100 INJECTION SUBCUTANEOUS at 11:19

## 2023-06-26 RX ADMIN — SODIUM CHLORIDE 1000 MG: 900 INJECTION INTRAVENOUS at 00:20

## 2023-06-26 ASSESSMENT — PAIN SCALES - GENERAL
PAINLEVEL_OUTOF10: 0
PAINLEVEL_OUTOF10: 2
PAINLEVEL_OUTOF10: 0

## 2023-06-27 ENCOUNTER — APPOINTMENT (OUTPATIENT)
Facility: HOSPITAL | Age: 88
DRG: 064 | End: 2023-06-27
Payer: MEDICARE

## 2023-06-27 PROBLEM — I63.412 CEREBROVASCULAR ACCIDENT (CVA) DUE TO EMBOLISM OF LEFT MIDDLE CEREBRAL ARTERY (HCC): Status: ACTIVE | Noted: 2023-06-27

## 2023-06-27 LAB
ANION GAP SERPL CALC-SCNC: 5 MMOL/L (ref 5–15)
BUN SERPL-MCNC: 17 MG/DL (ref 6–20)
BUN/CREAT SERPL: 24 (ref 12–20)
CALCIUM SERPL-MCNC: 9.2 MG/DL (ref 8.5–10.1)
CHLORIDE SERPL-SCNC: 117 MMOL/L (ref 97–108)
CO2 SERPL-SCNC: 26 MMOL/L (ref 21–32)
CREAT SERPL-MCNC: 0.7 MG/DL (ref 0.55–1.02)
ERYTHROCYTE [DISTWIDTH] IN BLOOD BY AUTOMATED COUNT: 14.8 % (ref 11.5–14.5)
GLUCOSE BLD STRIP.AUTO-MCNC: 86 MG/DL (ref 65–117)
GLUCOSE SERPL-MCNC: 85 MG/DL (ref 65–100)
HCT VFR BLD AUTO: 35.4 % (ref 35–47)
HGB BLD-MCNC: 11.3 G/DL (ref 11.5–16)
MAGNESIUM SERPL-MCNC: 1.9 MG/DL (ref 1.6–2.4)
MCH RBC QN AUTO: 30.2 PG (ref 26–34)
MCHC RBC AUTO-ENTMCNC: 31.9 G/DL (ref 30–36.5)
MCV RBC AUTO: 94.7 FL (ref 80–99)
NRBC # BLD: 0 K/UL (ref 0–0.01)
NRBC BLD-RTO: 0 PER 100 WBC
PHOSPHATE SERPL-MCNC: 2.7 MG/DL (ref 2.6–4.7)
PLATELET # BLD AUTO: 164 K/UL (ref 150–400)
PMV BLD AUTO: 10.9 FL (ref 8.9–12.9)
POTASSIUM SERPL-SCNC: 3.5 MMOL/L (ref 3.5–5.1)
PROCALCITONIN SERPL-MCNC: 0.32 NG/ML
RBC # BLD AUTO: 3.74 M/UL (ref 3.8–5.2)
SERVICE CMNT-IMP: NORMAL
SODIUM SERPL-SCNC: 148 MMOL/L (ref 136–145)
WBC # BLD AUTO: 4.5 K/UL (ref 3.6–11)

## 2023-06-27 PROCEDURE — 84145 PROCALCITONIN (PCT): CPT

## 2023-06-27 PROCEDURE — 6370000000 HC RX 637 (ALT 250 FOR IP): Performed by: NURSE PRACTITIONER

## 2023-06-27 PROCEDURE — 99232 SBSQ HOSP IP/OBS MODERATE 35: CPT | Performed by: PSYCHIATRY & NEUROLOGY

## 2023-06-27 PROCEDURE — 70450 CT HEAD/BRAIN W/O DYE: CPT

## 2023-06-27 PROCEDURE — 2060000000 HC ICU INTERMEDIATE R&B

## 2023-06-27 PROCEDURE — 92610 EVALUATE SWALLOWING FUNCTION: CPT

## 2023-06-27 PROCEDURE — 2580000003 HC RX 258: Performed by: INTERNAL MEDICINE

## 2023-06-27 PROCEDURE — 36415 COLL VENOUS BLD VENIPUNCTURE: CPT

## 2023-06-27 PROCEDURE — 85027 COMPLETE CBC AUTOMATED: CPT

## 2023-06-27 PROCEDURE — 83735 ASSAY OF MAGNESIUM: CPT

## 2023-06-27 PROCEDURE — 6360000002 HC RX W HCPCS: Performed by: INTERNAL MEDICINE

## 2023-06-27 PROCEDURE — 6370000000 HC RX 637 (ALT 250 FOR IP): Performed by: INTERNAL MEDICINE

## 2023-06-27 PROCEDURE — 2500000003 HC RX 250 WO HCPCS: Performed by: INTERNAL MEDICINE

## 2023-06-27 PROCEDURE — 6360000002 HC RX W HCPCS: Performed by: NURSE PRACTITIONER

## 2023-06-27 PROCEDURE — 84100 ASSAY OF PHOSPHORUS: CPT

## 2023-06-27 PROCEDURE — 2580000003 HC RX 258: Performed by: NURSE PRACTITIONER

## 2023-06-27 PROCEDURE — 92523 SPEECH SOUND LANG COMPREHEN: CPT

## 2023-06-27 PROCEDURE — 80048 BASIC METABOLIC PNL TOTAL CA: CPT

## 2023-06-27 PROCEDURE — 2700000000 HC OXYGEN THERAPY PER DAY

## 2023-06-27 PROCEDURE — 82962 GLUCOSE BLOOD TEST: CPT

## 2023-06-27 PROCEDURE — 2580000003 HC RX 258: Performed by: EMERGENCY MEDICINE

## 2023-06-27 RX ORDER — LIDOCAINE 4 G/G
1 PATCH TOPICAL DAILY
Status: DISCONTINUED | OUTPATIENT
Start: 2023-06-27 | End: 2023-07-06 | Stop reason: HOSPADM

## 2023-06-27 RX ORDER — VALSARTAN 160 MG/1
160 TABLET ORAL DAILY
Status: DISCONTINUED | OUTPATIENT
Start: 2023-06-27 | End: 2023-07-06 | Stop reason: HOSPADM

## 2023-06-27 RX ORDER — LIDOCAINE 4 G/G
1 PATCH TOPICAL DAILY
Status: DISCONTINUED | OUTPATIENT
Start: 2023-06-27 | End: 2023-06-27

## 2023-06-27 RX ORDER — SODIUM CHLORIDE, SODIUM LACTATE, POTASSIUM CHLORIDE, AND CALCIUM CHLORIDE .6; .31; .03; .02 G/100ML; G/100ML; G/100ML; G/100ML
500 INJECTION, SOLUTION INTRAVENOUS ONCE
Status: COMPLETED | OUTPATIENT
Start: 2023-06-27 | End: 2023-06-27

## 2023-06-27 RX ORDER — ATORVASTATIN CALCIUM 20 MG/1
20 TABLET, FILM COATED ORAL NIGHTLY
Status: DISCONTINUED | OUTPATIENT
Start: 2023-06-27 | End: 2023-07-06 | Stop reason: HOSPADM

## 2023-06-27 RX ADMIN — AZITHROMYCIN MONOHYDRATE 500 MG: 500 INJECTION, POWDER, LYOPHILIZED, FOR SOLUTION INTRAVENOUS at 00:14

## 2023-06-27 RX ADMIN — ATORVASTATIN CALCIUM 20 MG: 20 TABLET, FILM COATED ORAL at 20:17

## 2023-06-27 RX ADMIN — Medication: at 08:47

## 2023-06-27 RX ADMIN — METOPROLOL TARTRATE 25 MG: 25 TABLET, FILM COATED ORAL at 20:17

## 2023-06-27 RX ADMIN — OLANZAPINE 5 MG: 10 INJECTION, POWDER, FOR SOLUTION INTRAMUSCULAR at 19:03

## 2023-06-27 RX ADMIN — VALSARTAN 160 MG: 160 TABLET, FILM COATED ORAL at 10:34

## 2023-06-27 RX ADMIN — SODIUM CHLORIDE, PRESERVATIVE FREE 10 ML: 5 INJECTION INTRAVENOUS at 08:47

## 2023-06-27 RX ADMIN — ASPIRIN 81 MG: 81 TABLET, COATED ORAL at 09:53

## 2023-06-27 RX ADMIN — ENOXAPARIN SODIUM 40 MG: 100 INJECTION SUBCUTANEOUS at 08:47

## 2023-06-27 RX ADMIN — SODIUM CHLORIDE, POTASSIUM CHLORIDE, SODIUM LACTATE AND CALCIUM CHLORIDE 500 ML: 600; 310; 30; 20 INJECTION, SOLUTION INTRAVENOUS at 09:53

## 2023-06-27 RX ADMIN — SODIUM CHLORIDE, PRESERVATIVE FREE 10 ML: 5 INJECTION INTRAVENOUS at 20:18

## 2023-06-27 RX ADMIN — Medication: at 20:17

## 2023-06-27 ASSESSMENT — PAIN SCALES - GENERAL
PAINLEVEL_OUTOF10: 0

## 2023-06-28 ENCOUNTER — APPOINTMENT (OUTPATIENT)
Facility: HOSPITAL | Age: 88
DRG: 064 | End: 2023-06-28
Payer: MEDICARE

## 2023-06-28 PROBLEM — Z51.5 PALLIATIVE CARE BY SPECIALIST: Status: ACTIVE | Noted: 2023-06-28

## 2023-06-28 PROBLEM — R53.81 PHYSICAL DEBILITY: Status: ACTIVE | Noted: 2023-06-28

## 2023-06-28 PROBLEM — I63.512 ACUTE ISCHEMIC LEFT MCA STROKE (HCC): Status: ACTIVE | Noted: 2023-06-28

## 2023-06-28 LAB
ANION GAP SERPL CALC-SCNC: 9 MMOL/L (ref 5–15)
BUN SERPL-MCNC: 23 MG/DL (ref 6–20)
BUN/CREAT SERPL: 22 (ref 12–20)
CALCIUM SERPL-MCNC: 9.7 MG/DL (ref 8.5–10.1)
CHLORIDE SERPL-SCNC: 116 MMOL/L (ref 97–108)
CO2 SERPL-SCNC: 22 MMOL/L (ref 21–32)
CREAT SERPL-MCNC: 1.05 MG/DL (ref 0.55–1.02)
ERYTHROCYTE [DISTWIDTH] IN BLOOD BY AUTOMATED COUNT: 14.7 % (ref 11.5–14.5)
GLUCOSE BLD STRIP.AUTO-MCNC: 102 MG/DL (ref 65–117)
GLUCOSE BLD STRIP.AUTO-MCNC: 127 MG/DL (ref 65–117)
GLUCOSE BLD STRIP.AUTO-MCNC: 93 MG/DL (ref 65–117)
GLUCOSE SERPL-MCNC: 129 MG/DL (ref 65–100)
HCT VFR BLD AUTO: 39 % (ref 35–47)
HGB BLD-MCNC: 12.3 G/DL (ref 11.5–16)
MAGNESIUM SERPL-MCNC: 2 MG/DL (ref 1.6–2.4)
MCH RBC QN AUTO: 30.1 PG (ref 26–34)
MCHC RBC AUTO-ENTMCNC: 31.5 G/DL (ref 30–36.5)
MCV RBC AUTO: 95.4 FL (ref 80–99)
NRBC # BLD: 0 K/UL (ref 0–0.01)
NRBC BLD-RTO: 0 PER 100 WBC
PHOSPHATE SERPL-MCNC: 3.3 MG/DL (ref 2.6–4.7)
PLATELET # BLD AUTO: 192 K/UL (ref 150–400)
PMV BLD AUTO: 10.6 FL (ref 8.9–12.9)
POTASSIUM SERPL-SCNC: 3.8 MMOL/L (ref 3.5–5.1)
RBC # BLD AUTO: 4.09 M/UL (ref 3.8–5.2)
SERVICE CMNT-IMP: ABNORMAL
SERVICE CMNT-IMP: NORMAL
SERVICE CMNT-IMP: NORMAL
SODIUM SERPL-SCNC: 147 MMOL/L (ref 136–145)
WBC # BLD AUTO: 9.2 K/UL (ref 3.6–11)

## 2023-06-28 PROCEDURE — 85027 COMPLETE CBC AUTOMATED: CPT

## 2023-06-28 PROCEDURE — 2500000003 HC RX 250 WO HCPCS: Performed by: INTERNAL MEDICINE

## 2023-06-28 PROCEDURE — 2580000003 HC RX 258: Performed by: INTERNAL MEDICINE

## 2023-06-28 PROCEDURE — 2580000003 HC RX 258: Performed by: EMERGENCY MEDICINE

## 2023-06-28 PROCEDURE — 99222 1ST HOSP IP/OBS MODERATE 55: CPT | Performed by: NURSE PRACTITIONER

## 2023-06-28 PROCEDURE — 80048 BASIC METABOLIC PNL TOTAL CA: CPT

## 2023-06-28 PROCEDURE — 70450 CT HEAD/BRAIN W/O DYE: CPT

## 2023-06-28 PROCEDURE — 99232 SBSQ HOSP IP/OBS MODERATE 35: CPT | Performed by: PSYCHIATRY & NEUROLOGY

## 2023-06-28 PROCEDURE — 2060000000 HC ICU INTERMEDIATE R&B

## 2023-06-28 PROCEDURE — 6360000002 HC RX W HCPCS: Performed by: INTERNAL MEDICINE

## 2023-06-28 PROCEDURE — 36415 COLL VENOUS BLD VENIPUNCTURE: CPT

## 2023-06-28 PROCEDURE — 6370000000 HC RX 637 (ALT 250 FOR IP): Performed by: INTERNAL MEDICINE

## 2023-06-28 PROCEDURE — 84100 ASSAY OF PHOSPHORUS: CPT

## 2023-06-28 PROCEDURE — 83735 ASSAY OF MAGNESIUM: CPT

## 2023-06-28 PROCEDURE — 82962 GLUCOSE BLOOD TEST: CPT

## 2023-06-28 PROCEDURE — 6370000000 HC RX 637 (ALT 250 FOR IP): Performed by: NURSE PRACTITIONER

## 2023-06-28 PROCEDURE — 2700000000 HC OXYGEN THERAPY PER DAY

## 2023-06-28 PROCEDURE — 51798 US URINE CAPACITY MEASURE: CPT

## 2023-06-28 RX ORDER — DEXTROSE, SODIUM CHLORIDE, AND POTASSIUM CHLORIDE 5; .45; .075 G/100ML; G/100ML; G/100ML
INJECTION INTRAVENOUS CONTINUOUS
Status: DISCONTINUED | OUTPATIENT
Start: 2023-06-28 | End: 2023-06-29

## 2023-06-28 RX ADMIN — Medication: at 21:08

## 2023-06-28 RX ADMIN — CEFTRIAXONE 1000 MG: 1 INJECTION, POWDER, FOR SOLUTION INTRAMUSCULAR; INTRAVENOUS at 00:52

## 2023-06-28 RX ADMIN — METOPROLOL TARTRATE 25 MG: 25 TABLET, FILM COATED ORAL at 09:05

## 2023-06-28 RX ADMIN — ENOXAPARIN SODIUM 40 MG: 100 INJECTION SUBCUTANEOUS at 09:05

## 2023-06-28 RX ADMIN — POTASSIUM CHLORIDE, DEXTROSE MONOHYDRATE AND SODIUM CHLORIDE: 75; 5; 450 INJECTION, SOLUTION INTRAVENOUS at 17:33

## 2023-06-28 RX ADMIN — AZITHROMYCIN MONOHYDRATE 500 MG: 500 INJECTION, POWDER, LYOPHILIZED, FOR SOLUTION INTRAVENOUS at 02:00

## 2023-06-28 RX ADMIN — VALSARTAN 160 MG: 160 TABLET, FILM COATED ORAL at 12:39

## 2023-06-28 RX ADMIN — SODIUM CHLORIDE, PRESERVATIVE FREE 10 ML: 5 INJECTION INTRAVENOUS at 09:05

## 2023-06-28 RX ADMIN — ASPIRIN 81 MG: 81 TABLET, COATED ORAL at 09:04

## 2023-06-28 RX ADMIN — Medication: at 09:07

## 2023-06-28 RX ADMIN — SODIUM CHLORIDE, PRESERVATIVE FREE 10 ML: 5 INJECTION INTRAVENOUS at 21:09

## 2023-06-28 ASSESSMENT — PAIN SCALES - GENERAL
PAINLEVEL_OUTOF10: 2
PAINLEVEL_OUTOF10: 0
PAINLEVEL_OUTOF10: 2

## 2023-06-29 LAB
ANION GAP SERPL CALC-SCNC: 6 MMOL/L (ref 5–15)
BUN SERPL-MCNC: 17 MG/DL (ref 6–20)
BUN/CREAT SERPL: 28 (ref 12–20)
CALCIUM SERPL-MCNC: 8.1 MG/DL (ref 8.5–10.1)
CHLORIDE SERPL-SCNC: 122 MMOL/L (ref 97–108)
CO2 SERPL-SCNC: 24 MMOL/L (ref 21–32)
CREAT SERPL-MCNC: 0.6 MG/DL (ref 0.55–1.02)
ERYTHROCYTE [DISTWIDTH] IN BLOOD BY AUTOMATED COUNT: 14.8 % (ref 11.5–14.5)
GLUCOSE BLD STRIP.AUTO-MCNC: 102 MG/DL (ref 65–117)
GLUCOSE BLD STRIP.AUTO-MCNC: 120 MG/DL (ref 65–117)
GLUCOSE SERPL-MCNC: 89 MG/DL (ref 65–100)
HCT VFR BLD AUTO: 39.2 % (ref 35–47)
HGB BLD-MCNC: 11.7 G/DL (ref 11.5–16)
MAGNESIUM SERPL-MCNC: 1.8 MG/DL (ref 1.6–2.4)
MCH RBC QN AUTO: 29.1 PG (ref 26–34)
MCHC RBC AUTO-ENTMCNC: 29.8 G/DL (ref 30–36.5)
MCV RBC AUTO: 97.5 FL (ref 80–99)
NRBC # BLD: 0 K/UL (ref 0–0.01)
NRBC BLD-RTO: 0 PER 100 WBC
PHOSPHATE SERPL-MCNC: 2.3 MG/DL (ref 2.6–4.7)
PLATELET # BLD AUTO: 162 K/UL (ref 150–400)
PMV BLD AUTO: 10.7 FL (ref 8.9–12.9)
POTASSIUM SERPL-SCNC: 3.2 MMOL/L (ref 3.5–5.1)
RBC # BLD AUTO: 4.02 M/UL (ref 3.8–5.2)
SERVICE CMNT-IMP: ABNORMAL
SERVICE CMNT-IMP: NORMAL
SODIUM SERPL-SCNC: 152 MMOL/L (ref 136–145)
WBC # BLD AUTO: 5.3 K/UL (ref 3.6–11)

## 2023-06-29 PROCEDURE — 2700000000 HC OXYGEN THERAPY PER DAY

## 2023-06-29 PROCEDURE — 92612 ENDOSCOPY SWALLOW (FEES) VID: CPT

## 2023-06-29 PROCEDURE — 97165 OT EVAL LOW COMPLEX 30 MIN: CPT

## 2023-06-29 PROCEDURE — 2060000000 HC ICU INTERMEDIATE R&B

## 2023-06-29 PROCEDURE — 2580000003 HC RX 258: Performed by: INTERNAL MEDICINE

## 2023-06-29 PROCEDURE — 51702 INSERT TEMP BLADDER CATH: CPT

## 2023-06-29 PROCEDURE — 6360000002 HC RX W HCPCS: Performed by: INTERNAL MEDICINE

## 2023-06-29 PROCEDURE — 6360000002 HC RX W HCPCS: Performed by: PHYSICIAN ASSISTANT

## 2023-06-29 PROCEDURE — 84100 ASSAY OF PHOSPHORUS: CPT

## 2023-06-29 PROCEDURE — 97530 THERAPEUTIC ACTIVITIES: CPT

## 2023-06-29 PROCEDURE — 80048 BASIC METABOLIC PNL TOTAL CA: CPT

## 2023-06-29 PROCEDURE — 83735 ASSAY OF MAGNESIUM: CPT

## 2023-06-29 PROCEDURE — 92526 ORAL FUNCTION THERAPY: CPT

## 2023-06-29 PROCEDURE — 36415 COLL VENOUS BLD VENIPUNCTURE: CPT

## 2023-06-29 PROCEDURE — 82962 GLUCOSE BLOOD TEST: CPT

## 2023-06-29 PROCEDURE — 92507 TX SP LANG VOICE COMM INDIV: CPT

## 2023-06-29 PROCEDURE — 6370000000 HC RX 637 (ALT 250 FOR IP): Performed by: INTERNAL MEDICINE

## 2023-06-29 PROCEDURE — 2580000003 HC RX 258: Performed by: EMERGENCY MEDICINE

## 2023-06-29 PROCEDURE — 6370000000 HC RX 637 (ALT 250 FOR IP): Performed by: NURSE PRACTITIONER

## 2023-06-29 PROCEDURE — 85027 COMPLETE CBC AUTOMATED: CPT

## 2023-06-29 PROCEDURE — 51701 INSERT BLADDER CATHETER: CPT

## 2023-06-29 PROCEDURE — 99232 SBSQ HOSP IP/OBS MODERATE 35: CPT | Performed by: PSYCHIATRY & NEUROLOGY

## 2023-06-29 PROCEDURE — 51798 US URINE CAPACITY MEASURE: CPT

## 2023-06-29 PROCEDURE — 97162 PT EVAL MOD COMPLEX 30 MIN: CPT

## 2023-06-29 RX ORDER — POTASSIUM CHLORIDE 750 MG/1
40 TABLET, FILM COATED, EXTENDED RELEASE ORAL PRN
Status: DISCONTINUED | OUTPATIENT
Start: 2023-06-29 | End: 2023-07-06 | Stop reason: HOSPADM

## 2023-06-29 RX ORDER — POTASSIUM CHLORIDE 7.45 MG/ML
10 INJECTION INTRAVENOUS PRN
Status: DISCONTINUED | OUTPATIENT
Start: 2023-06-29 | End: 2023-07-06 | Stop reason: HOSPADM

## 2023-06-29 RX ADMIN — CEFTRIAXONE 1000 MG: 1 INJECTION, POWDER, FOR SOLUTION INTRAMUSCULAR; INTRAVENOUS at 00:23

## 2023-06-29 RX ADMIN — SODIUM CHLORIDE, PRESERVATIVE FREE 10 ML: 5 INJECTION INTRAVENOUS at 09:08

## 2023-06-29 RX ADMIN — Medication: at 09:07

## 2023-06-29 RX ADMIN — ENOXAPARIN SODIUM 40 MG: 100 INJECTION SUBCUTANEOUS at 09:06

## 2023-06-29 RX ADMIN — CEFTRIAXONE 1000 MG: 1 INJECTION, POWDER, FOR SOLUTION INTRAMUSCULAR; INTRAVENOUS at 23:06

## 2023-06-29 RX ADMIN — POTASSIUM CHLORIDE 10 MEQ: 7.46 INJECTION, SOLUTION INTRAVENOUS at 05:41

## 2023-06-29 RX ADMIN — VALSARTAN 160 MG: 160 TABLET, FILM COATED ORAL at 18:37

## 2023-06-29 RX ADMIN — POTASSIUM CHLORIDE 10 MEQ: 7.46 INJECTION, SOLUTION INTRAVENOUS at 03:11

## 2023-06-29 RX ADMIN — SODIUM CHLORIDE, PRESERVATIVE FREE 10 ML: 5 INJECTION INTRAVENOUS at 21:42

## 2023-06-29 RX ADMIN — AZITHROMYCIN MONOHYDRATE 500 MG: 500 INJECTION, POWDER, LYOPHILIZED, FOR SOLUTION INTRAVENOUS at 00:22

## 2023-06-29 RX ADMIN — POTASSIUM CHLORIDE 10 MEQ: 7.46 INJECTION, SOLUTION INTRAVENOUS at 04:18

## 2023-06-29 RX ADMIN — AZITHROMYCIN MONOHYDRATE 500 MG: 500 INJECTION, POWDER, LYOPHILIZED, FOR SOLUTION INTRAVENOUS at 23:37

## 2023-06-29 RX ADMIN — ASPIRIN 300 MG: 300 SUPPOSITORY RECTAL at 09:11

## 2023-06-29 RX ADMIN — POTASSIUM CHLORIDE 10 MEQ: 7.46 INJECTION, SOLUTION INTRAVENOUS at 06:49

## 2023-06-29 ASSESSMENT — PAIN SCALES - GENERAL
PAINLEVEL_OUTOF10: 0

## 2023-06-30 LAB
GLUCOSE BLD STRIP.AUTO-MCNC: 124 MG/DL (ref 65–117)
GLUCOSE BLD STRIP.AUTO-MCNC: 98 MG/DL (ref 65–117)
SERVICE CMNT-IMP: ABNORMAL
SERVICE CMNT-IMP: NORMAL

## 2023-06-30 PROCEDURE — 6360000002 HC RX W HCPCS: Performed by: INTERNAL MEDICINE

## 2023-06-30 PROCEDURE — 97112 NEUROMUSCULAR REEDUCATION: CPT

## 2023-06-30 PROCEDURE — 92507 TX SP LANG VOICE COMM INDIV: CPT

## 2023-06-30 PROCEDURE — 2060000000 HC ICU INTERMEDIATE R&B

## 2023-06-30 PROCEDURE — 2580000003 HC RX 258: Performed by: INTERNAL MEDICINE

## 2023-06-30 PROCEDURE — 6370000000 HC RX 637 (ALT 250 FOR IP): Performed by: NURSE PRACTITIONER

## 2023-06-30 PROCEDURE — 92526 ORAL FUNCTION THERAPY: CPT

## 2023-06-30 PROCEDURE — 99233 SBSQ HOSP IP/OBS HIGH 50: CPT | Performed by: NURSE PRACTITIONER

## 2023-06-30 PROCEDURE — 97116 GAIT TRAINING THERAPY: CPT

## 2023-06-30 PROCEDURE — 6370000000 HC RX 637 (ALT 250 FOR IP): Performed by: INTERNAL MEDICINE

## 2023-06-30 PROCEDURE — 97535 SELF CARE MNGMENT TRAINING: CPT

## 2023-06-30 PROCEDURE — 97530 THERAPEUTIC ACTIVITIES: CPT

## 2023-06-30 PROCEDURE — 94760 N-INVAS EAR/PLS OXIMETRY 1: CPT

## 2023-06-30 PROCEDURE — 2700000000 HC OXYGEN THERAPY PER DAY

## 2023-06-30 PROCEDURE — 2580000003 HC RX 258: Performed by: EMERGENCY MEDICINE

## 2023-06-30 PROCEDURE — 82962 GLUCOSE BLOOD TEST: CPT

## 2023-06-30 RX ORDER — SPIRONOLACTONE 25 MG/1
12.5 TABLET ORAL DAILY
Status: DISCONTINUED | OUTPATIENT
Start: 2023-06-30 | End: 2023-07-06 | Stop reason: HOSPADM

## 2023-06-30 RX ADMIN — SPIRONOLACTONE 12.5 MG: 25 TABLET ORAL at 09:28

## 2023-06-30 RX ADMIN — VALSARTAN 160 MG: 160 TABLET, FILM COATED ORAL at 09:28

## 2023-06-30 RX ADMIN — ATORVASTATIN CALCIUM 20 MG: 20 TABLET, FILM COATED ORAL at 20:35

## 2023-06-30 RX ADMIN — ASPIRIN 81 MG: 81 TABLET, COATED ORAL at 09:28

## 2023-06-30 RX ADMIN — SODIUM CHLORIDE, PRESERVATIVE FREE 10 ML: 5 INJECTION INTRAVENOUS at 20:36

## 2023-06-30 RX ADMIN — ENOXAPARIN SODIUM 40 MG: 100 INJECTION SUBCUTANEOUS at 09:27

## 2023-06-30 RX ADMIN — AZITHROMYCIN MONOHYDRATE 500 MG: 500 INJECTION, POWDER, LYOPHILIZED, FOR SOLUTION INTRAVENOUS at 23:02

## 2023-06-30 ASSESSMENT — PAIN SCALES - GENERAL
PAINLEVEL_OUTOF10: 0

## 2023-07-01 LAB
ANION GAP SERPL CALC-SCNC: 4 MMOL/L (ref 5–15)
BASOPHILS # BLD: 0 K/UL (ref 0–0.1)
BASOPHILS NFR BLD: 0 % (ref 0–1)
BUN SERPL-MCNC: 13 MG/DL (ref 6–20)
BUN/CREAT SERPL: 23 (ref 12–20)
CALCIUM SERPL-MCNC: 9.2 MG/DL (ref 8.5–10.1)
CHLORIDE SERPL-SCNC: 116 MMOL/L (ref 97–108)
CO2 SERPL-SCNC: 26 MMOL/L (ref 21–32)
CREAT SERPL-MCNC: 0.56 MG/DL (ref 0.55–1.02)
DIFFERENTIAL METHOD BLD: ABNORMAL
EOSINOPHIL # BLD: 0.1 K/UL (ref 0–0.4)
EOSINOPHIL NFR BLD: 1 % (ref 0–7)
ERYTHROCYTE [DISTWIDTH] IN BLOOD BY AUTOMATED COUNT: 14.5 % (ref 11.5–14.5)
GLUCOSE SERPL-MCNC: 98 MG/DL (ref 65–100)
HCT VFR BLD AUTO: 35.9 % (ref 35–47)
HGB BLD-MCNC: 11 G/DL (ref 11.5–16)
IMM GRANULOCYTES # BLD AUTO: 0 K/UL (ref 0–0.04)
IMM GRANULOCYTES NFR BLD AUTO: 0 % (ref 0–0.5)
LYMPHOCYTES # BLD: 1 K/UL (ref 0.8–3.5)
LYMPHOCYTES NFR BLD: 27 % (ref 12–49)
MCH RBC QN AUTO: 29.8 PG (ref 26–34)
MCHC RBC AUTO-ENTMCNC: 30.6 G/DL (ref 30–36.5)
MCV RBC AUTO: 97.3 FL (ref 80–99)
MONOCYTES # BLD: 0.4 K/UL (ref 0–1)
MONOCYTES NFR BLD: 9 % (ref 5–13)
NEUTS SEG # BLD: 2.4 K/UL (ref 1.8–8)
NEUTS SEG NFR BLD: 63 % (ref 32–75)
NRBC # BLD: 0 K/UL (ref 0–0.01)
NRBC BLD-RTO: 0 PER 100 WBC
PLATELET # BLD AUTO: 166 K/UL (ref 150–400)
PMV BLD AUTO: 11 FL (ref 8.9–12.9)
POTASSIUM SERPL-SCNC: 3.9 MMOL/L (ref 3.5–5.1)
RBC # BLD AUTO: 3.69 M/UL (ref 3.8–5.2)
SODIUM SERPL-SCNC: 146 MMOL/L (ref 136–145)
WBC # BLD AUTO: 3.8 K/UL (ref 3.6–11)

## 2023-07-01 PROCEDURE — 2580000003 HC RX 258: Performed by: EMERGENCY MEDICINE

## 2023-07-01 PROCEDURE — 2060000000 HC ICU INTERMEDIATE R&B

## 2023-07-01 PROCEDURE — 80048 BASIC METABOLIC PNL TOTAL CA: CPT

## 2023-07-01 PROCEDURE — 85025 COMPLETE CBC W/AUTO DIFF WBC: CPT

## 2023-07-01 PROCEDURE — 2700000000 HC OXYGEN THERAPY PER DAY

## 2023-07-01 PROCEDURE — 6370000000 HC RX 637 (ALT 250 FOR IP): Performed by: INTERNAL MEDICINE

## 2023-07-01 PROCEDURE — 6360000002 HC RX W HCPCS: Performed by: INTERNAL MEDICINE

## 2023-07-01 PROCEDURE — 2580000003 HC RX 258: Performed by: INTERNAL MEDICINE

## 2023-07-01 PROCEDURE — 36415 COLL VENOUS BLD VENIPUNCTURE: CPT

## 2023-07-01 PROCEDURE — 6370000000 HC RX 637 (ALT 250 FOR IP): Performed by: NURSE PRACTITIONER

## 2023-07-01 RX ADMIN — Medication: at 20:32

## 2023-07-01 RX ADMIN — POLYETHYLENE GLYCOL 3350 17 G: 17 POWDER, FOR SOLUTION ORAL at 17:32

## 2023-07-01 RX ADMIN — ENOXAPARIN SODIUM 40 MG: 100 INJECTION SUBCUTANEOUS at 09:56

## 2023-07-01 RX ADMIN — ASPIRIN 81 MG: 81 TABLET, COATED ORAL at 09:57

## 2023-07-01 RX ADMIN — Medication: at 09:58

## 2023-07-01 RX ADMIN — SODIUM CHLORIDE, PRESERVATIVE FREE 10 ML: 5 INJECTION INTRAVENOUS at 20:33

## 2023-07-01 RX ADMIN — SODIUM CHLORIDE, PRESERVATIVE FREE 10 ML: 5 INJECTION INTRAVENOUS at 10:00

## 2023-07-01 RX ADMIN — CEFTRIAXONE 1000 MG: 1 INJECTION, POWDER, FOR SOLUTION INTRAMUSCULAR; INTRAVENOUS at 00:04

## 2023-07-01 RX ADMIN — ATORVASTATIN CALCIUM 20 MG: 20 TABLET, FILM COATED ORAL at 20:32

## 2023-07-01 RX ADMIN — SPIRONOLACTONE 12.5 MG: 25 TABLET ORAL at 09:57

## 2023-07-01 RX ADMIN — VALSARTAN 160 MG: 160 TABLET, FILM COATED ORAL at 10:00

## 2023-07-01 ASSESSMENT — PAIN SCALES - GENERAL
PAINLEVEL_OUTOF10: 0

## 2023-07-02 ENCOUNTER — APPOINTMENT (OUTPATIENT)
Facility: HOSPITAL | Age: 88
DRG: 064 | End: 2023-07-02
Payer: MEDICARE

## 2023-07-02 LAB
BACTERIA SPEC CULT: NORMAL
GLUCOSE BLD STRIP.AUTO-MCNC: 112 MG/DL (ref 65–117)
SERVICE CMNT-IMP: NORMAL
SERVICE CMNT-IMP: NORMAL

## 2023-07-02 PROCEDURE — 6360000002 HC RX W HCPCS: Performed by: INTERNAL MEDICINE

## 2023-07-02 PROCEDURE — 2580000003 HC RX 258: Performed by: EMERGENCY MEDICINE

## 2023-07-02 PROCEDURE — 6370000000 HC RX 637 (ALT 250 FOR IP): Performed by: INTERNAL MEDICINE

## 2023-07-02 PROCEDURE — 70450 CT HEAD/BRAIN W/O DYE: CPT

## 2023-07-02 PROCEDURE — 2700000000 HC OXYGEN THERAPY PER DAY

## 2023-07-02 PROCEDURE — 99233 SBSQ HOSP IP/OBS HIGH 50: CPT | Performed by: PSYCHIATRY & NEUROLOGY

## 2023-07-02 PROCEDURE — 2060000000 HC ICU INTERMEDIATE R&B

## 2023-07-02 PROCEDURE — 2500000003 HC RX 250 WO HCPCS: Performed by: INTERNAL MEDICINE

## 2023-07-02 PROCEDURE — 2580000003 HC RX 258: Performed by: INTERNAL MEDICINE

## 2023-07-02 PROCEDURE — 82962 GLUCOSE BLOOD TEST: CPT

## 2023-07-02 PROCEDURE — 6370000000 HC RX 637 (ALT 250 FOR IP): Performed by: NURSE PRACTITIONER

## 2023-07-02 PROCEDURE — 2500000003 HC RX 250 WO HCPCS

## 2023-07-02 RX ORDER — DILTIAZEM HYDROCHLORIDE 5 MG/ML
10 INJECTION INTRAVENOUS ONCE
Status: COMPLETED | OUTPATIENT
Start: 2023-07-02 | End: 2023-07-02

## 2023-07-02 RX ORDER — DILTIAZEM HYDROCHLORIDE 5 MG/ML
INJECTION INTRAVENOUS
Status: COMPLETED
Start: 2023-07-02 | End: 2023-07-02

## 2023-07-02 RX ADMIN — ENOXAPARIN SODIUM 40 MG: 100 INJECTION SUBCUTANEOUS at 09:30

## 2023-07-02 RX ADMIN — VALSARTAN 160 MG: 160 TABLET, FILM COATED ORAL at 09:27

## 2023-07-02 RX ADMIN — AMIODARONE HYDROCHLORIDE 150 MG: 1.5 INJECTION, SOLUTION INTRAVENOUS at 17:53

## 2023-07-02 RX ADMIN — DILTIAZEM HYDROCHLORIDE 10 MG: 5 INJECTION INTRAVENOUS at 14:37

## 2023-07-02 RX ADMIN — AMIODARONE HYDROCHLORIDE 1 MG/MIN: 50 INJECTION, SOLUTION INTRAVENOUS at 18:07

## 2023-07-02 RX ADMIN — ASPIRIN 81 MG: 81 TABLET, COATED ORAL at 09:37

## 2023-07-02 RX ADMIN — Medication: at 09:29

## 2023-07-02 RX ADMIN — AMIODARONE HYDROCHLORIDE 0.5 MG/MIN: 50 INJECTION, SOLUTION INTRAVENOUS at 23:30

## 2023-07-02 RX ADMIN — SODIUM CHLORIDE, PRESERVATIVE FREE 10 ML: 5 INJECTION INTRAVENOUS at 09:28

## 2023-07-02 RX ADMIN — SODIUM CHLORIDE, PRESERVATIVE FREE 10 ML: 5 INJECTION INTRAVENOUS at 21:44

## 2023-07-02 RX ADMIN — Medication: at 21:44

## 2023-07-02 RX ADMIN — ATORVASTATIN CALCIUM 20 MG: 20 TABLET, FILM COATED ORAL at 21:43

## 2023-07-02 RX ADMIN — SODIUM CHLORIDE 5 MG/HR: 900 INJECTION, SOLUTION INTRAVENOUS at 14:46

## 2023-07-02 RX ADMIN — SPIRONOLACTONE 12.5 MG: 25 TABLET ORAL at 09:27

## 2023-07-02 ASSESSMENT — PAIN SCALES - GENERAL
PAINLEVEL_OUTOF10: 0

## 2023-07-03 PROBLEM — J96.01 ACUTE RESPIRATORY FAILURE WITH HYPOXIA (HCC): Status: ACTIVE | Noted: 2023-07-03

## 2023-07-03 LAB
ANION GAP SERPL CALC-SCNC: 4 MMOL/L (ref 5–15)
BACTERIA SPEC CULT: NORMAL
BUN SERPL-MCNC: 11 MG/DL (ref 6–20)
BUN/CREAT SERPL: 14 (ref 12–20)
CALCIUM SERPL-MCNC: 9.4 MG/DL (ref 8.5–10.1)
CHLORIDE SERPL-SCNC: 106 MMOL/L (ref 97–108)
CO2 SERPL-SCNC: 27 MMOL/L (ref 21–32)
CREAT SERPL-MCNC: 0.77 MG/DL (ref 0.55–1.02)
GLUCOSE SERPL-MCNC: 167 MG/DL (ref 65–100)
POTASSIUM SERPL-SCNC: 3.9 MMOL/L (ref 3.5–5.1)
SERVICE CMNT-IMP: NORMAL
SODIUM SERPL-SCNC: 137 MMOL/L (ref 136–145)

## 2023-07-03 PROCEDURE — 2500000003 HC RX 250 WO HCPCS: Performed by: INTERNAL MEDICINE

## 2023-07-03 PROCEDURE — 99231 SBSQ HOSP IP/OBS SF/LOW 25: CPT | Performed by: NURSE PRACTITIONER

## 2023-07-03 PROCEDURE — 80048 BASIC METABOLIC PNL TOTAL CA: CPT

## 2023-07-03 PROCEDURE — 99232 SBSQ HOSP IP/OBS MODERATE 35: CPT | Performed by: PSYCHIATRY & NEUROLOGY

## 2023-07-03 PROCEDURE — 6370000000 HC RX 637 (ALT 250 FOR IP): Performed by: PSYCHIATRY & NEUROLOGY

## 2023-07-03 PROCEDURE — 6370000000 HC RX 637 (ALT 250 FOR IP): Performed by: INTERNAL MEDICINE

## 2023-07-03 PROCEDURE — 92526 ORAL FUNCTION THERAPY: CPT

## 2023-07-03 PROCEDURE — 97530 THERAPEUTIC ACTIVITIES: CPT

## 2023-07-03 PROCEDURE — 92507 TX SP LANG VOICE COMM INDIV: CPT

## 2023-07-03 PROCEDURE — 6370000000 HC RX 637 (ALT 250 FOR IP): Performed by: NURSE PRACTITIONER

## 2023-07-03 PROCEDURE — 36415 COLL VENOUS BLD VENIPUNCTURE: CPT

## 2023-07-03 PROCEDURE — 2060000000 HC ICU INTERMEDIATE R&B

## 2023-07-03 PROCEDURE — 2580000003 HC RX 258: Performed by: EMERGENCY MEDICINE

## 2023-07-03 RX ORDER — AMIODARONE HYDROCHLORIDE 200 MG/1
400 TABLET ORAL 2 TIMES DAILY
Status: DISCONTINUED | OUTPATIENT
Start: 2023-07-03 | End: 2023-07-06 | Stop reason: HOSPADM

## 2023-07-03 RX ORDER — METOPROLOL TARTRATE 5 MG/5ML
5 INJECTION INTRAVENOUS EVERY 6 HOURS
Status: DISCONTINUED | OUTPATIENT
Start: 2023-07-03 | End: 2023-07-04

## 2023-07-03 RX ADMIN — APIXABAN 2.5 MG: 2.5 TABLET, FILM COATED ORAL at 22:09

## 2023-07-03 RX ADMIN — VALSARTAN 160 MG: 160 TABLET, FILM COATED ORAL at 09:41

## 2023-07-03 RX ADMIN — APIXABAN 2.5 MG: 2.5 TABLET, FILM COATED ORAL at 00:12

## 2023-07-03 RX ADMIN — AMIODARONE HYDROCHLORIDE 400 MG: 200 TABLET ORAL at 22:09

## 2023-07-03 RX ADMIN — Medication: at 22:09

## 2023-07-03 RX ADMIN — SPIRONOLACTONE 12.5 MG: 25 TABLET ORAL at 09:41

## 2023-07-03 RX ADMIN — SODIUM CHLORIDE, PRESERVATIVE FREE 10 ML: 5 INJECTION INTRAVENOUS at 22:10

## 2023-07-03 RX ADMIN — ASPIRIN 81 MG: 81 TABLET, COATED ORAL at 09:41

## 2023-07-03 RX ADMIN — METOPROLOL TARTRATE 5 MG: 5 INJECTION INTRAVENOUS at 16:53

## 2023-07-03 RX ADMIN — Medication: at 09:42

## 2023-07-03 RX ADMIN — SODIUM CHLORIDE, PRESERVATIVE FREE 10 ML: 5 INJECTION INTRAVENOUS at 09:47

## 2023-07-03 RX ADMIN — AMIODARONE HYDROCHLORIDE 400 MG: 200 TABLET ORAL at 11:20

## 2023-07-03 RX ADMIN — APIXABAN 2.5 MG: 2.5 TABLET, FILM COATED ORAL at 11:04

## 2023-07-03 ASSESSMENT — PAIN SCALES - GENERAL
PAINLEVEL_OUTOF10: 0
PAINLEVEL_OUTOF10: 0
PAINLEVEL_OUTOF10: 3
PAINLEVEL_OUTOF10: 0

## 2023-07-03 NOTE — CARE COORDINATION
Updated PT notes needed. Transition of Care Plan:     RUR: 15% \"moderate risk\"  Prior Level of Functioning: Dependent with ADL's  Disposition: SNF-Accepted to [facility]  If SNF or IPR: Date FOC offered: 06/30/23  Asad Julia [address] received: 07/03/23  Accepting facility: [facility]  Date authorization started with reference number: 07/03/23; Ref # [id number]  Date authorization received and expires: pending  Follow up appointments: To be arranged by SNF  DME needed: Has a cane at home  Transportation at discharge: BLS transport  IM/IMM Medicare/ letter given: 2nd IM letter needed at d/c  Is patient a [facility] and connected with [facility]? N/A  Caregiver Contact: Pt's kaity Steve [phone] and Janet Maykel [phone]  Discharge Caregiver contacted prior to discharge? To bes contacted at d/c  Care Conference needed? Not at this time  Barriers to discharge:  Medical clearance     Update 1530pm: CM met with pt's son  Nitin Steve [phone] at bedside and discussed d/c planning. Pt's son was made aware that pt was accepted to [facility]. Son is agreeing to [facility]. Pt's son notified that pt will need insurance auth and CM will initiate auth today. Son notified that CM will call with updates. CM contacted [employer] [phone] and initiated auth. Ref # [id number]. CM has been requested to send updated medical notes to [phone]. CM will continue to follow up with d/c planning. Initial note: Chart reviewed for updates. CM has received SNF options from pt's son. Pt's son chose [facility] as first choice, [facility] as second choice, [facility], [address] and rehab. Referral sent pending disposition. CM will continue to follow up with d/c planning.     CHANELLE Wen    [phone]

## 2023-07-04 PROCEDURE — 6370000000 HC RX 637 (ALT 250 FOR IP): Performed by: PSYCHIATRY & NEUROLOGY

## 2023-07-04 PROCEDURE — 6370000000 HC RX 637 (ALT 250 FOR IP): Performed by: INTERNAL MEDICINE

## 2023-07-04 PROCEDURE — 6370000000 HC RX 637 (ALT 250 FOR IP): Performed by: NURSE PRACTITIONER

## 2023-07-04 PROCEDURE — 2580000003 HC RX 258: Performed by: EMERGENCY MEDICINE

## 2023-07-04 PROCEDURE — 2060000000 HC ICU INTERMEDIATE R&B

## 2023-07-04 RX ORDER — METOPROLOL SUCCINATE 25 MG/1
25 TABLET, EXTENDED RELEASE ORAL DAILY
Status: DISCONTINUED | OUTPATIENT
Start: 2023-07-04 | End: 2023-07-06 | Stop reason: HOSPADM

## 2023-07-04 RX ADMIN — Medication: at 22:30

## 2023-07-04 RX ADMIN — METOPROLOL SUCCINATE 25 MG: 25 TABLET, EXTENDED RELEASE ORAL at 10:51

## 2023-07-04 RX ADMIN — AMIODARONE HYDROCHLORIDE 400 MG: 200 TABLET ORAL at 21:52

## 2023-07-04 RX ADMIN — VALSARTAN 160 MG: 160 TABLET, FILM COATED ORAL at 10:51

## 2023-07-04 RX ADMIN — ATORVASTATIN CALCIUM 20 MG: 20 TABLET, FILM COATED ORAL at 21:53

## 2023-07-04 RX ADMIN — SPIRONOLACTONE 12.5 MG: 25 TABLET ORAL at 10:51

## 2023-07-04 RX ADMIN — APIXABAN 2.5 MG: 2.5 TABLET, FILM COATED ORAL at 23:03

## 2023-07-04 RX ADMIN — APIXABAN 2.5 MG: 2.5 TABLET, FILM COATED ORAL at 10:51

## 2023-07-04 RX ADMIN — SODIUM CHLORIDE, PRESERVATIVE FREE 10 ML: 5 INJECTION INTRAVENOUS at 21:54

## 2023-07-04 RX ADMIN — ASPIRIN 81 MG: 81 TABLET, COATED ORAL at 10:51

## 2023-07-04 RX ADMIN — SODIUM CHLORIDE, PRESERVATIVE FREE 10 ML: 5 INJECTION INTRAVENOUS at 10:52

## 2023-07-04 RX ADMIN — AMIODARONE HYDROCHLORIDE 400 MG: 200 TABLET ORAL at 10:51

## 2023-07-04 RX ADMIN — Medication: at 10:30

## 2023-07-04 ASSESSMENT — PAIN SCALES - GENERAL
PAINLEVEL_OUTOF10: 0

## 2023-07-05 LAB
ANION GAP SERPL CALC-SCNC: 3 MMOL/L (ref 5–15)
BASOPHILS # BLD: 0 K/UL (ref 0–0.1)
BASOPHILS NFR BLD: 0 % (ref 0–1)
BUN SERPL-MCNC: 16 MG/DL (ref 6–20)
BUN/CREAT SERPL: 19 (ref 12–20)
CALCIUM SERPL-MCNC: 9.8 MG/DL (ref 8.5–10.1)
CHLORIDE SERPL-SCNC: 107 MMOL/L (ref 97–108)
CO2 SERPL-SCNC: 28 MMOL/L (ref 21–32)
CREAT SERPL-MCNC: 0.83 MG/DL (ref 0.55–1.02)
DIFFERENTIAL METHOD BLD: ABNORMAL
EOSINOPHIL # BLD: 0.1 K/UL (ref 0–0.4)
EOSINOPHIL NFR BLD: 1 % (ref 0–7)
ERYTHROCYTE [DISTWIDTH] IN BLOOD BY AUTOMATED COUNT: 14.6 % (ref 11.5–14.5)
GLUCOSE SERPL-MCNC: 95 MG/DL (ref 65–100)
HCT VFR BLD AUTO: 42.4 % (ref 35–47)
HGB BLD-MCNC: 13.2 G/DL (ref 11.5–16)
IMM GRANULOCYTES # BLD AUTO: 0 K/UL (ref 0–0.04)
IMM GRANULOCYTES NFR BLD AUTO: 1 % (ref 0–0.5)
LYMPHOCYTES # BLD: 1.2 K/UL (ref 0.8–3.5)
LYMPHOCYTES NFR BLD: 15 % (ref 12–49)
MCH RBC QN AUTO: 29.1 PG (ref 26–34)
MCHC RBC AUTO-ENTMCNC: 31.1 G/DL (ref 30–36.5)
MCV RBC AUTO: 93.6 FL (ref 80–99)
MONOCYTES # BLD: 0.6 K/UL (ref 0–1)
MONOCYTES NFR BLD: 8 % (ref 5–13)
NEUTS SEG # BLD: 6.1 K/UL (ref 1.8–8)
NEUTS SEG NFR BLD: 75 % (ref 32–75)
NRBC # BLD: 0 K/UL (ref 0–0.01)
NRBC BLD-RTO: 0 PER 100 WBC
PLATELET # BLD AUTO: 269 K/UL (ref 150–400)
PMV BLD AUTO: 11 FL (ref 8.9–12.9)
POTASSIUM SERPL-SCNC: 4.2 MMOL/L (ref 3.5–5.1)
RBC # BLD AUTO: 4.53 M/UL (ref 3.8–5.2)
SODIUM SERPL-SCNC: 138 MMOL/L (ref 136–145)
WBC # BLD AUTO: 8.1 K/UL (ref 3.6–11)

## 2023-07-05 PROCEDURE — 6370000000 HC RX 637 (ALT 250 FOR IP): Performed by: PSYCHIATRY & NEUROLOGY

## 2023-07-05 PROCEDURE — 97530 THERAPEUTIC ACTIVITIES: CPT

## 2023-07-05 PROCEDURE — 2060000000 HC ICU INTERMEDIATE R&B

## 2023-07-05 PROCEDURE — 6370000000 HC RX 637 (ALT 250 FOR IP): Performed by: INTERNAL MEDICINE

## 2023-07-05 PROCEDURE — 92507 TX SP LANG VOICE COMM INDIV: CPT | Performed by: SPEECH-LANGUAGE PATHOLOGIST

## 2023-07-05 PROCEDURE — 97535 SELF CARE MNGMENT TRAINING: CPT

## 2023-07-05 PROCEDURE — 6370000000 HC RX 637 (ALT 250 FOR IP): Performed by: NURSE PRACTITIONER

## 2023-07-05 PROCEDURE — 85025 COMPLETE CBC W/AUTO DIFF WBC: CPT

## 2023-07-05 PROCEDURE — 80048 BASIC METABOLIC PNL TOTAL CA: CPT

## 2023-07-05 PROCEDURE — 97116 GAIT TRAINING THERAPY: CPT

## 2023-07-05 PROCEDURE — 36415 COLL VENOUS BLD VENIPUNCTURE: CPT

## 2023-07-05 PROCEDURE — 2580000003 HC RX 258: Performed by: EMERGENCY MEDICINE

## 2023-07-05 RX ORDER — ATORVASTATIN CALCIUM 20 MG/1
20 TABLET, FILM COATED ORAL NIGHTLY
Qty: 30 TABLET | Refills: 1 | Status: SHIPPED | OUTPATIENT
Start: 2023-07-05

## 2023-07-05 RX ORDER — ASPIRIN 81 MG/1
81 TABLET ORAL DAILY
Qty: 30 TABLET | Refills: 1 | Status: SHIPPED | OUTPATIENT
Start: 2023-07-06

## 2023-07-05 RX ORDER — METOPROLOL SUCCINATE 25 MG/1
25 TABLET, EXTENDED RELEASE ORAL DAILY
Qty: 30 TABLET | Refills: 1 | Status: SHIPPED | OUTPATIENT
Start: 2023-07-06

## 2023-07-05 RX ORDER — AMIODARONE HYDROCHLORIDE 200 MG/1
200 TABLET ORAL 2 TIMES DAILY
Qty: 60 TABLET | Refills: 0 | Status: SHIPPED | OUTPATIENT
Start: 2023-07-05

## 2023-07-05 RX ORDER — SPIRONOLACTONE 25 MG/1
12.5 TABLET ORAL DAILY
Qty: 30 TABLET | Refills: 1 | Status: SHIPPED | OUTPATIENT
Start: 2023-07-06

## 2023-07-05 RX ADMIN — AMIODARONE HYDROCHLORIDE 400 MG: 200 TABLET ORAL at 09:28

## 2023-07-05 RX ADMIN — SPIRONOLACTONE 12.5 MG: 25 TABLET ORAL at 09:28

## 2023-07-05 RX ADMIN — VALSARTAN 160 MG: 160 TABLET, FILM COATED ORAL at 09:28

## 2023-07-05 RX ADMIN — Medication: at 09:29

## 2023-07-05 RX ADMIN — SODIUM CHLORIDE, PRESERVATIVE FREE 10 ML: 5 INJECTION INTRAVENOUS at 09:29

## 2023-07-05 RX ADMIN — AMIODARONE HYDROCHLORIDE 400 MG: 200 TABLET ORAL at 21:35

## 2023-07-05 RX ADMIN — METOPROLOL SUCCINATE 25 MG: 25 TABLET, EXTENDED RELEASE ORAL at 09:29

## 2023-07-05 RX ADMIN — SODIUM CHLORIDE, PRESERVATIVE FREE 10 ML: 5 INJECTION INTRAVENOUS at 21:38

## 2023-07-05 RX ADMIN — ATORVASTATIN CALCIUM 20 MG: 20 TABLET, FILM COATED ORAL at 21:35

## 2023-07-05 RX ADMIN — APIXABAN 2.5 MG: 2.5 TABLET, FILM COATED ORAL at 11:40

## 2023-07-05 RX ADMIN — ASPIRIN 81 MG: 81 TABLET, COATED ORAL at 09:28

## 2023-07-05 RX ADMIN — APIXABAN 2.5 MG: 2.5 TABLET, FILM COATED ORAL at 22:00

## 2023-07-05 RX ADMIN — Medication: at 21:37

## 2023-07-05 NOTE — CARE COORDINATION
Transition of Care Plan:    RUR: 14%  Disposition: SNF- [address] [place] [address]    Room 108  Call report to:     If SNF or IPR: Date FOC offered: 6/30/23  Date [address] received: 7/3/23  Date authorization started with reference number:  started 7/3/23 Reference number- [id number]  Date authorization received and expires:   Received 7/5/23, Liss Pi # [id number] for 7/5/23-7/7/23   Accepting facility: [address] [place] [address]    Follow up appointments: PCP, Specialist  DME needed: Has cane at home, Facility will provide  Transportation at Discharge: [facility] to provide transportation at 1930pm.  [place] or means to access home:   Son   has access  IM Medicare Letter: 2nd IM Letter given  Is patient a  and connected with the [place]? N/A            If yes, was [employer] transfer form completed and VA notified? Caregiver Contact: Son- [employer]  Discharge Caregiver contacted prior to discharge? CM called and discussed with d/c plan with Son, Brianne Amor, Son agreed with d/c plan to [facility] and transport time at 1800. Care Conference needed?:    no           Updated Note 1544pm:  CM received a call from [facility] with updated transport tie of 1930pm.    Updated note 1330  CM received a call for Ramona Cox from [address] [place] [address] with admission info. . See above      Initial note  CM spoke with Ramona Cox with [address] [place] [address] to update on pt auth was received. She shared computer was updating and she will call back with bed. They can accept pt today. Transportation was arranged with [facility] and they can transport pt at 1800.         [address]  Phone: [phone]

## 2023-07-05 NOTE — CARE COORDINATION
Transition of Care Plan to SNF/Rehab    Communication to Patient/Family:  Met with patient and family and they are agreeable to the transition plan. The Plan for Transition of Care is related to the following treatment goals: SNF    The Patient and/or patient representative was provided with a choice of provider and agrees  with the discharge plan. Yes [x] No []    A Freedom of choice list was provided with basic dialogue that supports the patient's individualized plan of care/goals and shares the quality data associated with the providers. Yes [x] No []    SNF/Rehab Transition:  Patient has been accepted to  and meets criteria for admission. Patient will transported by [facility] [place] and expected to leave at 1930-new time from 1800. Communication to SNF/Rehab:  Bedside RN, , has been notified to update the transition plan to the facility and call report (phone number- [phone]). Discharge information has been updated on the AVS. And communicated to facility via [employer], or CC link. Discharge instructions to be fax'd to facility at [facility] #). Nursing Please include all hard scripts for controlled substances, med rec and dc summary, and AVS in packet. Reviewed and confirmed with facility, [address] [place] [address], can manage the patient care needs for the following:     Vonda Galeano with (X) only those applicable:  Medication:  [x]Medications are available at the facility  []IV Antibiotics    []Controlled Substance - hard copies available sent.   []Weekly Labs    Equipment:  []CPAP/BiPAP  []Wound Vacuum  []Bolden or Urinary Device  []PICC/Central Line  []Nebulizer  []Ventilator    Treatment:  []Isolation (for MRSA, VRE, etc.)  []Surgical Drain Management  []Tracheostomy Care  []Dressing Changes  []Dialysis with transportation  []PEG Care  []Oxygen  []Daily Weights for Heart Failure    Dietary:  []Any diet limitations  []Tube Feedings   []Total Parenteral Management (TPN)

## 2023-07-05 NOTE — DISCHARGE INSTRUCTIONS
Patient Discharge Instructions    Varsha [facility] / [id number] : 1935    Admitted 2023 Discharged: 2023         DISCHARGE DIAGNOSIS:   Left MCA stroke  Delirium  New onset AF with RVR   Hypernatremia  CHF systolic   Aspiration PNA      Take Home Medications     {Medication reconciliation information is now added to the patient's AVS automatically when it is printed. There is no need to use this SmartLink in discharge instructions. Highlight this text and delete it to clear this message}      General drug facts     If you have a very bad allergy, wear an allergy ID at all times. It is important that you take the medication exactly as they are prescribed. Keep your medication in the bottles provided by the pharmacist.  Keep a list of all your drugs (prescription, natural products, vitamins, OTC) with you. Give this list to your doctor. Do not take other medications without consulting your doctor. Do not share your drugs with others and do not take anyone else's drugs. Keep all drugs out of the reach of children and pets. Most drugs may be thrown away in household trash after mixing with coffee grounds or pino litter and sealing in a plastic bag. Keep a list Call your doctor for help with any side effects. If in the [place] you may also call the FDA at 9-840-FDA-9262    Talk with the doctor before starting any new drug, including OTC, natural products, or vitamins. What to do at Home    1. Recommended diet: Dysphagia pureed     2. Recommended activity: activity as tolerated    3. If you experience any of the following symptoms then please call your primary care physician or return to the emergency room if you cannot get hold of your doctor:    4. Wound Care: None     5. Lab work: Cbc and Bmp in 1 week    6. Check blood pressure daily and send to PCP     7. Bring these papers with you to your follow up appointments.  The papers will help your doctors be sure to continue the

## 2023-07-06 VITALS
SYSTOLIC BLOOD PRESSURE: 149 MMHG | BODY MASS INDEX: 25.52 KG/M2 | OXYGEN SATURATION: 94 % | RESPIRATION RATE: 22 BRPM | HEART RATE: 88 BPM | HEIGHT: 60 IN | WEIGHT: 130 LBS | TEMPERATURE: 98.1 F | DIASTOLIC BLOOD PRESSURE: 90 MMHG

## 2023-07-06 PROCEDURE — 51798 US URINE CAPACITY MEASURE: CPT

## 2023-07-06 PROCEDURE — 6370000000 HC RX 637 (ALT 250 FOR IP): Performed by: NURSE PRACTITIONER

## 2023-07-06 PROCEDURE — 6370000000 HC RX 637 (ALT 250 FOR IP): Performed by: INTERNAL MEDICINE

## 2023-07-06 PROCEDURE — 6370000000 HC RX 637 (ALT 250 FOR IP): Performed by: PSYCHIATRY & NEUROLOGY

## 2023-07-06 RX ORDER — QUETIAPINE FUMARATE 25 MG/1
25 TABLET, FILM COATED ORAL ONCE
Status: COMPLETED | OUTPATIENT
Start: 2023-07-06 | End: 2023-07-06

## 2023-07-06 RX ORDER — BISACODYL 10 MG
10 SUPPOSITORY, RECTAL RECTAL DAILY PRN
Status: DISCONTINUED | OUTPATIENT
Start: 2023-07-06 | End: 2023-07-06 | Stop reason: HOSPADM

## 2023-07-06 RX ADMIN — APIXABAN 2.5 MG: 2.5 TABLET, FILM COATED ORAL at 09:25

## 2023-07-06 RX ADMIN — ASPIRIN 81 MG: 81 TABLET, COATED ORAL at 09:25

## 2023-07-06 RX ADMIN — VALSARTAN 160 MG: 160 TABLET, FILM COATED ORAL at 09:25

## 2023-07-06 RX ADMIN — AMIODARONE HYDROCHLORIDE 400 MG: 200 TABLET ORAL at 09:25

## 2023-07-06 RX ADMIN — Medication: at 09:25

## 2023-07-06 RX ADMIN — METOPROLOL SUCCINATE 25 MG: 25 TABLET, EXTENDED RELEASE ORAL at 09:25

## 2023-07-06 RX ADMIN — SPIRONOLACTONE 12.5 MG: 25 TABLET ORAL at 09:24

## 2023-07-06 RX ADMIN — QUETIAPINE FUMARATE 25 MG: 25 TABLET ORAL at 01:04

## 2023-08-12 ENCOUNTER — HOSPITAL ENCOUNTER (EMERGENCY)
Facility: HOSPITAL | Age: 88
Discharge: HOME | End: 2023-08-12
Attending: EMERGENCY MEDICINE
Payer: MEDICARE

## 2023-08-12 VITALS
OXYGEN SATURATION: 98 % | SYSTOLIC BLOOD PRESSURE: 155 MMHG | TEMPERATURE: 97.7 F | DIASTOLIC BLOOD PRESSURE: 84 MMHG | RESPIRATION RATE: 16 BRPM | HEART RATE: 53 BPM

## 2023-08-12 DIAGNOSIS — R00.1 BRADYCARDIA: ICD-10-CM

## 2023-08-12 DIAGNOSIS — S31.000A SACRAL WOUND, INITIAL ENCOUNTER: Primary | ICD-10-CM

## 2023-08-12 LAB
ALBUMIN SERPL-MCNC: 2.4 G/DL (ref 3.5–5)
ALBUMIN/GLOB SERPL: 0.6 (ref 1.1–2.2)
ALP SERPL-CCNC: 70 U/L (ref 45–117)
ALT SERPL-CCNC: 13 U/L (ref 12–78)
ANION GAP SERPL CALC-SCNC: 0 MMOL/L (ref 5–15)
APPEARANCE UR: CLEAR
AST SERPL-CCNC: 10 U/L (ref 15–37)
BACTERIA URNS QL MICRO: NEGATIVE /HPF
BASOPHILS # BLD: 0 K/UL (ref 0–0.1)
BASOPHILS NFR BLD: 1 % (ref 0–1)
BILIRUB SERPL-MCNC: 0.3 MG/DL (ref 0.2–1)
BILIRUB UR QL: NEGATIVE
BUN SERPL-MCNC: 16 MG/DL (ref 6–20)
BUN/CREAT SERPL: 17 (ref 12–20)
CALCIUM SERPL-MCNC: 9.7 MG/DL (ref 8.5–10.1)
CHLORIDE SERPL-SCNC: 108 MMOL/L (ref 97–108)
CO2 SERPL-SCNC: 31 MMOL/L (ref 21–32)
COLOR UR: ABNORMAL
CREAT SERPL-MCNC: 0.93 MG/DL (ref 0.55–1.02)
DIFFERENTIAL METHOD BLD: NORMAL
EOSINOPHIL # BLD: 0 K/UL (ref 0–0.4)
EOSINOPHIL NFR BLD: 1 % (ref 0–7)
EPITH CASTS URNS QL MICRO: ABNORMAL /LPF
ERYTHROCYTE [DISTWIDTH] IN BLOOD BY AUTOMATED COUNT: 14.2 % (ref 11.5–14.5)
GLOBULIN SER CALC-MCNC: 4.1 G/DL (ref 2–4)
GLUCOSE SERPL-MCNC: 97 MG/DL (ref 65–100)
GLUCOSE UR STRIP.AUTO-MCNC: NEGATIVE MG/DL
HCT VFR BLD AUTO: 37.9 % (ref 35–47)
HEMOCCULT STL QL: NEGATIVE
HGB BLD-MCNC: 11.8 G/DL (ref 11.5–16)
HGB UR QL STRIP: NEGATIVE
HYALINE CASTS URNS QL MICRO: ABNORMAL /LPF (ref 0–2)
IMM GRANULOCYTES # BLD AUTO: 0 K/UL (ref 0–0.04)
IMM GRANULOCYTES NFR BLD AUTO: 0 % (ref 0–0.5)
KETONES UR QL STRIP.AUTO: NEGATIVE MG/DL
LEUKOCYTE ESTERASE UR QL STRIP.AUTO: ABNORMAL
LYMPHOCYTES # BLD: 1.1 K/UL (ref 0.8–3.5)
LYMPHOCYTES NFR BLD: 28 % (ref 12–49)
MCH RBC QN AUTO: 29.5 PG (ref 26–34)
MCHC RBC AUTO-ENTMCNC: 31.1 G/DL (ref 30–36.5)
MCV RBC AUTO: 94.8 FL (ref 80–99)
MONOCYTES # BLD: 0.4 K/UL (ref 0–1)
MONOCYTES NFR BLD: 9 % (ref 5–13)
NEUTS SEG # BLD: 2.3 K/UL (ref 1.8–8)
NEUTS SEG NFR BLD: 61 % (ref 32–75)
NITRITE UR QL STRIP.AUTO: NEGATIVE
NRBC # BLD: 0 K/UL (ref 0–0.01)
NRBC BLD-RTO: 0 PER 100 WBC
PH UR STRIP: 7 (ref 5–8)
PLATELET # BLD AUTO: 186 K/UL (ref 150–400)
PMV BLD AUTO: 10.6 FL (ref 8.9–12.9)
POTASSIUM SERPL-SCNC: 4.3 MMOL/L (ref 3.5–5.1)
PROT SERPL-MCNC: 6.5 G/DL (ref 6.4–8.2)
PROT UR STRIP-MCNC: NEGATIVE MG/DL
RBC # BLD AUTO: 4 M/UL (ref 3.8–5.2)
RBC #/AREA URNS HPF: ABNORMAL /HPF (ref 0–5)
SODIUM SERPL-SCNC: 139 MMOL/L (ref 136–145)
SP GR UR REFRACTOMETRY: 1.02
URINE CULTURE IF INDICATED: ABNORMAL
UROBILINOGEN UR QL STRIP.AUTO: 2 EU/DL (ref 0.2–1)
WBC # BLD AUTO: 3.8 K/UL (ref 3.6–11)
WBC URNS QL MICRO: ABNORMAL /HPF (ref 0–4)

## 2023-08-12 PROCEDURE — 80053 COMPREHEN METABOLIC PANEL: CPT

## 2023-08-12 PROCEDURE — 81001 URINALYSIS AUTO W/SCOPE: CPT

## 2023-08-12 PROCEDURE — 36415 COLL VENOUS BLD VENIPUNCTURE: CPT

## 2023-08-12 PROCEDURE — 85025 COMPLETE CBC W/AUTO DIFF WBC: CPT

## 2023-08-12 PROCEDURE — 82272 OCCULT BLD FECES 1-3 TESTS: CPT

## 2023-08-12 PROCEDURE — 99283 EMERGENCY DEPT VISIT LOW MDM: CPT

## 2023-08-12 NOTE — ED NOTES
1428: I have reviewed discharge instructions with the patient and family at this time. The Family member verbalized understanding and denies any further questions. Patient wheeled out to waiting room at this time by family.  Family member to take patient back [facility]  08/12/23 1424

## 2023-08-12 NOTE — DISCHARGE INSTRUCTIONS
Your heart rate was low. You should talk to your neurologist or primary care doctor about metoprolol 25 mg which may be causing your heart rate to be too slow.

## 2023-08-12 NOTE — ED NOTES
Straight catheter attempted but pt urinated prior and sample collected. Pt cleaned up mepilex on sacrum replaced.       Yousuf Rey RN  08/12/23 8714

## 2023-09-20 ENCOUNTER — TELEMEDICINE (OUTPATIENT)
Age: 88
End: 2023-09-20

## 2023-09-20 DIAGNOSIS — I69.351 HEMIPARESIS OF RIGHT DOMINANT SIDE AS LATE EFFECT OF CEREBRAL INFARCTION (HCC): ICD-10-CM

## 2023-09-20 DIAGNOSIS — I48.0 PAROXYSMAL ATRIAL FIBRILLATION (HCC): ICD-10-CM

## 2023-09-20 DIAGNOSIS — Z86.73 HISTORY OF CEREBROVASCULAR ACCIDENT (CVA) DUE TO EMBOLISM: Primary | ICD-10-CM

## 2023-09-20 DIAGNOSIS — I69.320 APHASIA AS LATE EFFECT OF CEREBROVASCULAR ACCIDENT (CVA): ICD-10-CM

## 2023-09-20 NOTE — ASSESSMENT & PLAN NOTE
Ms. Santiago Avery is an 53-year-old female who had a left MCA distribution ischemic infarct June 5620 cardioembolic in nature related to the new diagnosis of atrial fibrillation. Visual right-sided hemiparesis as well as receptive and expressive aphasia. 1.  Continue Eliquis 2.5 mg twice daily  2. Minimize the risk of bleeding, will discontinue aspirin 81 mg due to patient's stroke was cardioembolic related to her atrial fibrillation and CTA with admission with relatively clean cerebral vasculature / low burden of atherosclerotic disease  3. Patient is not receiving physical or occupational therapy. Her son feels she would benefit from this. I have placed an order for both PT and OT -require home PT and OT. 4.  Resides in assisted living. Son does not have a current medication list.  We will request medication list to be faxed current set of vital signs to be faxed to our office. 5.  We will schedule outpatient follow-up at [facility] either having Ms. Nata Fernandez presenting to the clinic versus virtual visit. At that time I requested that her son have a current med list as well as the most recent set of vitals. 6.  Continue Lipitor 20 mg nightly.

## 2023-09-20 NOTE — PROGRESS NOTES
12.  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 Likes to travel. Family History   Problem Relation Age of Onset    Hypertension Mother     Other Neg Hx         high calcium or kidney stones        ROS  Had a left left hemispheric stroke and remains aphasic. Review of systems was performed with the assistant of her son. General: No evidence to suggest headache, blurred vision, double vision, fevers, chills, weight loss, weight gain. Appetite good. Sleeping well. No nasal congestion, nasal discharge. Cardiovascular: No evidence to suggest chest pain or palpitations, resolved when she was transitioned off Norvasc  Respiratory: No evidence to suggest SOB or cough. GI: No nausea, vomiting or diarrhea. .  : No noted hematuria, evidence to suggest dysuria, urinary frequency or urgency. Neurologic: Receptive as well as. Right-sided weakness involving the upper and lower extremity. Objective: There were no vitals filed for this visit. There is no height or weight on file to calculate BMI. Physical Exam:  Due to aphasia exam was performed with the assistance of patient's son  General:  Well defined, wells nourished, and groomed individual in no acute distress. HEENT: normocephalic  Neck: trach is midline, upon observation neck appears to be supple. Cardiovascular: Unable to reliably assess due to telehealth visit  Respiratory: Upon observation equal chest expansion, nonlabored respirations  GI: Unable to assess due to telehealth visit  : Unable to assess due to telehealth visit  Psych:  Pleasant mood and affect    NEUROLOGICAL EXAMINATION:     Mental Status:   Patient is awake, she is alert. She does track with her eyes. She produces minimal unintelligible speech. She did not follow commands such as lifting her arms, when asked if she would smile she did open her mouth.   Cranial Nerves:  I: smell Not tested   II: visual fields Not assessed   II: pupils Unable to reliably assess

## 2023-09-20 NOTE — PATIENT INSTRUCTIONS
Ms. Tami Fleming, it was a pleasure meeting you today with your son via telehealth. Your stroke was due to your atrial fibrillation, recommend continuing your Eliquis at 2.5 mg twice a day. We will discontinue your aspirin 81 mg at this point to minimize risk of hemorrhage. Continue taking your Lipitor 20 mg nightly. We have reached out to [facility] and requested an updated medication reconciliation as well as a recent set of vital signs. The medication list included in this document is our record of what you are currently taking, including any changes that were made at today's visit. If you find any differences when compared to your medications at home, or have any questions that were not answered at your visit, please contact the office. After speaking with your son it appears that you would benefit from additional Physical as well as Occupational Therapy. Prescriptions have been written been written for both physical as well as Occupational Therapy. I recommend you take these to administrative staff at [address] [place] [address] with regards to recommending a local Physical and Occupational Therapy organization that we will do home visits.

## 2023-09-21 ENCOUNTER — TELEPHONE (OUTPATIENT)
Age: 88
End: 2023-09-21

## 2023-09-21 PROBLEM — I48.91 ATRIAL FIBRILLATION (HCC): Status: ACTIVE | Noted: 2023-09-21

## 2023-09-21 PROBLEM — I63.512 ACUTE ISCHEMIC LEFT MCA STROKE (HCC): Status: RESOLVED | Noted: 2023-06-28 | Resolved: 2023-09-21

## 2023-09-21 PROBLEM — I63.412 CEREBROVASCULAR ACCIDENT (CVA) DUE TO EMBOLISM OF LEFT MIDDLE CEREBRAL ARTERY (HCC): Status: RESOLVED | Noted: 2023-06-27 | Resolved: 2023-09-21

## 2023-09-21 NOTE — TELEPHONE ENCOUNTER
Attempted to contact [facility] jeanie at [phone] to get updated medication list and to get fax number to fax pt and ot orders.

## 2023-09-21 NOTE — ASSESSMENT & PLAN NOTE
New onset atrial fibrillation. Appears to be rate controlled - managed per cardiology / PCP   1. On amiodarone 200 mg twice daily per cardiology recommendation   2. On metoprolol succinate 25 mg daily   3. Continue Eliquis 2.5 mg twice daily.

## 2023-09-29 ENCOUNTER — TELEPHONE (OUTPATIENT)
Age: 88
End: 2023-09-29

## 2023-09-29 NOTE — TELEPHONE ENCOUNTER
Patient's son stopped into the office to inquire about the status of the disability paperwork and would like a call back when possible.

## 2023-10-02 ENCOUNTER — TELEPHONE (OUTPATIENT)
Age: 88
End: 2023-10-02

## 2023-10-02 NOTE — TELEPHONE ENCOUNTER
Called son Jamar Rausch regarding paperwork on pt. Advised that we have the paperwork on pt but Eben Harper has been out of the office. She will return tomorrow in office. I will give to her then. Once she completes it I will call him. Rodney verbalized understanding.

## 2023-10-07 ENCOUNTER — HOSPITAL ENCOUNTER (EMERGENCY)
Facility: HOSPITAL | Age: 88
Discharge: HOME | End: 2023-10-07
Attending: EMERGENCY MEDICINE
Payer: MEDICARE

## 2023-10-07 ENCOUNTER — APPOINTMENT (OUTPATIENT)
Facility: HOSPITAL | Age: 88
End: 2023-10-07
Payer: MEDICARE

## 2023-10-07 VITALS
HEIGHT: 60 IN | DIASTOLIC BLOOD PRESSURE: 63 MMHG | OXYGEN SATURATION: 99 % | SYSTOLIC BLOOD PRESSURE: 136 MMHG | RESPIRATION RATE: 16 BRPM | TEMPERATURE: 97.5 F | WEIGHT: 130 LBS | BODY MASS INDEX: 25.52 KG/M2 | HEART RATE: 46 BPM

## 2023-10-07 DIAGNOSIS — R05.1 ACUTE COUGH: Primary | ICD-10-CM

## 2023-10-07 DIAGNOSIS — G93.31 POSTVIRAL FATIGUE SYNDROME: ICD-10-CM

## 2023-10-07 LAB
ALBUMIN SERPL-MCNC: 2.3 G/DL (ref 3.5–5)
ALBUMIN/GLOB SERPL: 0.5 (ref 1.1–2.2)
ALP SERPL-CCNC: 129 U/L (ref 45–117)
ALT SERPL-CCNC: 822 U/L (ref 12–78)
ANION GAP SERPL CALC-SCNC: 3 MMOL/L (ref 5–15)
AST SERPL-CCNC: 774 U/L (ref 15–37)
BASOPHILS # BLD: 0 K/UL (ref 0–0.1)
BASOPHILS NFR BLD: 0 % (ref 0–1)
BILIRUB SERPL-MCNC: 1.3 MG/DL (ref 0.2–1)
BUN SERPL-MCNC: 15 MG/DL (ref 6–20)
BUN/CREAT SERPL: 16 (ref 12–20)
CALCIUM SERPL-MCNC: 9.4 MG/DL (ref 8.5–10.1)
CHLORIDE SERPL-SCNC: 106 MMOL/L (ref 97–108)
CO2 SERPL-SCNC: 28 MMOL/L (ref 21–32)
CREAT SERPL-MCNC: 0.94 MG/DL (ref 0.55–1.02)
DIFFERENTIAL METHOD BLD: ABNORMAL
EOSINOPHIL # BLD: 0 K/UL (ref 0–0.4)
EOSINOPHIL NFR BLD: 1 % (ref 0–7)
ERYTHROCYTE [DISTWIDTH] IN BLOOD BY AUTOMATED COUNT: 14.8 % (ref 11.5–14.5)
GLOBULIN SER CALC-MCNC: 4.6 G/DL (ref 2–4)
GLUCOSE SERPL-MCNC: 109 MG/DL (ref 65–100)
HCT VFR BLD AUTO: 39.3 % (ref 35–47)
HGB BLD-MCNC: 12.2 G/DL (ref 11.5–16)
IMM GRANULOCYTES # BLD AUTO: 0 K/UL (ref 0–0.04)
IMM GRANULOCYTES NFR BLD AUTO: 0 % (ref 0–0.5)
LYMPHOCYTES # BLD: 0.9 K/UL (ref 0.8–3.5)
LYMPHOCYTES NFR BLD: 33 % (ref 12–49)
MCH RBC QN AUTO: 29.4 PG (ref 26–34)
MCHC RBC AUTO-ENTMCNC: 31 G/DL (ref 30–36.5)
MCV RBC AUTO: 94.7 FL (ref 80–99)
MONOCYTES # BLD: 0.3 K/UL (ref 0–1)
MONOCYTES NFR BLD: 11 % (ref 5–13)
NEUTS SEG # BLD: 1.5 K/UL (ref 1.8–8)
NEUTS SEG NFR BLD: 55 % (ref 32–75)
NRBC # BLD: 0 K/UL (ref 0–0.01)
NRBC BLD-RTO: 0 PER 100 WBC
PLATELET # BLD AUTO: 176 K/UL (ref 150–400)
PMV BLD AUTO: 9.7 FL (ref 8.9–12.9)
POTASSIUM SERPL-SCNC: 4 MMOL/L (ref 3.5–5.1)
PROT SERPL-MCNC: 6.9 G/DL (ref 6.4–8.2)
RBC # BLD AUTO: 4.15 M/UL (ref 3.8–5.2)
SODIUM SERPL-SCNC: 137 MMOL/L (ref 136–145)
WBC # BLD AUTO: 2.8 K/UL (ref 3.6–11)

## 2023-10-07 PROCEDURE — 99284 EMERGENCY DEPT VISIT MOD MDM: CPT

## 2023-10-07 PROCEDURE — 85025 COMPLETE CBC W/AUTO DIFF WBC: CPT

## 2023-10-07 PROCEDURE — 71045 X-RAY EXAM CHEST 1 VIEW: CPT

## 2023-10-07 PROCEDURE — 80053 COMPREHEN METABOLIC PANEL: CPT

## 2023-10-07 PROCEDURE — 36415 COLL VENOUS BLD VENIPUNCTURE: CPT

## 2023-10-07 PROCEDURE — 2580000003 HC RX 258: Performed by: EMERGENCY MEDICINE

## 2023-10-07 RX ORDER — BENZONATATE 100 MG/1
100 CAPSULE ORAL 3 TIMES DAILY PRN
Qty: 30 CAPSULE | Refills: 0 | Status: SHIPPED | OUTPATIENT
Start: 2023-10-07 | End: 2023-10-17

## 2023-10-07 RX ORDER — 0.9 % SODIUM CHLORIDE 0.9 %
1000 INTRAVENOUS SOLUTION INTRAVENOUS ONCE
Status: COMPLETED | OUTPATIENT
Start: 2023-10-07 | End: 2023-10-07

## 2023-10-07 RX ADMIN — SODIUM CHLORIDE 1000 ML: 9 INJECTION, SOLUTION INTRAVENOUS at 13:06

## 2023-10-07 ASSESSMENT — PAIN - FUNCTIONAL ASSESSMENT: PAIN_FUNCTIONAL_ASSESSMENT: ADULT NONVERBAL PAIN SCALE (NPVS)

## 2023-10-07 ASSESSMENT — ENCOUNTER SYMPTOMS
COLOR CHANGE: 0
COUGH: 1
VOMITING: 0

## 2023-10-07 NOTE — ED NOTES
Attempted to call report to #[phone], no answer. Voicemail non specific, no message left.       Ady Flores RN  10/07/23 6666

## 2023-10-07 NOTE — DISCHARGE INSTRUCTIONS
Armen Morales was sent to the emergency room for refusing to eat and drink. Her labs here do not show any evidence of dehydration or electrolyte derangements. Her cough does not show pneumonia. I would recommend giving her the things to drink that she likes to drink for example, juice or Pepsi. She may not start eating again until she feels better, just make sure she stays hydrated. For her cough, I have sent a prescription to the pharmacy.

## 2023-10-07 NOTE — ED PROVIDER NOTES
hospitals EMERGENCY DEPT  EMERGENCY DEPARTMENT ENCOUNTER       Pt Name: Vaibhav Peterson  MRN: [id number]  [address] [facility] [place] 1935  Date of evaluation: 10/7/2023  Provider: Angelique Paul MD   PCP: Lamont Umana MD  Note Started: 12:15pm     CHIEF COMPLAINT       Chief Complaint   Patient presents with    Cough     Dx Covid+ on Thursday at home nsg home. Fever then 102. Today, coughing persists, pt unable to eat/swallow. Pt is nonverbal from previous stroke. HISTORY OF PRESENT ILLNESS: 1 or more elements      History From: EMS and SNF forms     Vaibhav Peterson is a 82 y.o. female who presents via EMS for trouble breathing and swallowing this AM. Diagnosed with COVID Thursday and now trouble eating, breathing and thick cough. Nursing Notes were all reviewed and agreed with or any disagreements were addressed in the HPI. REVIEW OF SYSTEMS      Review of Systems   Unable to perform ROS: Other   Constitutional:  Positive for fever. Respiratory:  Positive for cough. Gastrointestinal:  Negative for vomiting. Skin:  Negative for color change and rash. Non-verbal 2/2 prior stroke    PAST HISTORY     Past Medical History:  Past Medical History:   Diagnosis Date    DJD (degenerative joint disease)     Hypercalcemia     Hypertension     Vitamin D deficiency          Past Surgical History:  Past Surgical History:   Procedure Laterality Date    CARPAL TUNNEL RELEASE Right     PARTIAL HYSTERECTOMY (CERVIX NOT REMOVED)         Family History:  Family History   Problem Relation Age of Onset    Hypertension Mother     Other Neg Hx         high calcium or kidney stones       Social History:  Social History     Tobacco Use    Smoking status: Former     Packs/day: .5     Types: Cigarettes     Quit date: 1988     Years since quittin.7    Smokeless tobacco: Never   Substance Use Topics    Alcohol use: No     Alcohol/week: 0.0 standard drinks of alcohol    Drug use: No       Allergies:   Allergies Course User Index  [JT] Emilie William MD       FINAL IMPRESSION     1. Acute cough    2. Postviral fatigue syndrome          DISPOSITION/PLAN   DISPOSITION Decision To Discharge 10/07/2023 04:20:00 PM      Discharge Note: The patient is stable for discharge home. The signs, symptoms, diagnosis, and discharge instructions have been discussed, understanding conveyed, and agreed upon. The patient is to follow up as recommended or return to ER should their symptoms worsen. PATIENT REFERRED TO:  No follow-up provider specified. DISCHARGE MEDICATIONS:     Medication List        START taking these medications      benzonatate 100 MG capsule  Commonly known as: TESSALON  Take 1 capsule by mouth 3 times daily as needed for Cough            ASK your doctor about these medications      acetaminophen 325 MG tablet  Commonly known as: TYLENOL     amiodarone 200 MG tablet  Commonly known as: CORDARONE  Take 1 tablet by mouth 2 times daily     apixaban 2.5 MG Tabs tablet  Commonly known as: ELIQUIS  Take 1 tablet by mouth 2 times daily     atorvastatin 20 MG tablet  Commonly known as: LIPITOR  Take 1 tablet by mouth nightly     metoprolol succinate 25 MG extended release tablet  Commonly known as: TOPROL XL  Take 1 tablet by mouth daily     spironolactone 25 MG tablet  Commonly known as: ALDACTONE  Take 0.5 tablets by mouth daily     valsartan 160 MG tablet  Commonly known as: DIOVAN               Where to Get Your Medications        These medications were sent to [address] [place] [address],[address] [phone]  [address] [place] [address]      Phone: [phone]   benzonatate 100 MG capsule           DISCONTINUED MEDICATIONS:  Discharge Medication List as of 10/7/2023  4:21 PM          I am the Primary Clinician of Record.    Leonel Álvarez MD (electronically signed)      (Please note that parts of this dictation were completed with voice

## 2023-11-01 ENCOUNTER — HOSPITAL ENCOUNTER (INPATIENT)
Facility: HOSPITAL | Age: 88
LOS: 9 days | Discharge: OTHER INSTITUTION | End: 2023-11-10
Attending: STUDENT IN AN ORGANIZED HEALTH CARE EDUCATION/TRAINING PROGRAM | Admitting: INTERNAL MEDICINE
Payer: MEDICARE

## 2023-11-01 ENCOUNTER — APPOINTMENT (OUTPATIENT)
Facility: HOSPITAL | Age: 88
End: 2023-11-01
Payer: MEDICARE

## 2023-11-01 DIAGNOSIS — E86.0 DEHYDRATION: ICD-10-CM

## 2023-11-01 DIAGNOSIS — E87.0 HYPERNATREMIA: Primary | ICD-10-CM

## 2023-11-01 LAB
ALBUMIN SERPL-MCNC: 2.3 G/DL (ref 3.5–5)
ALBUMIN/GLOB SERPL: 0.5 (ref 1.1–2.2)
ALP SERPL-CCNC: 90 U/L (ref 45–117)
ALT SERPL-CCNC: 56 U/L (ref 12–78)
ANION GAP SERPL CALC-SCNC: 0 MMOL/L (ref 5–15)
ANION GAP SERPL CALC-SCNC: 0 MMOL/L (ref 5–15)
ANION GAP SERPL CALC-SCNC: 2 MMOL/L (ref 5–15)
APPEARANCE UR: CLEAR
AST SERPL-CCNC: 45 U/L (ref 15–37)
BACTERIA URNS QL MICRO: NEGATIVE /HPF
BASOPHILS # BLD: 0 K/UL (ref 0–0.1)
BASOPHILS NFR BLD: 0 % (ref 0–1)
BILIRUB SERPL-MCNC: 0.9 MG/DL (ref 0.2–1)
BILIRUB UR QL CFM: NEGATIVE
BUN SERPL-MCNC: 41 MG/DL (ref 6–20)
BUN SERPL-MCNC: 43 MG/DL (ref 6–20)
BUN SERPL-MCNC: 48 MG/DL (ref 6–20)
BUN/CREAT SERPL: 31 (ref 12–20)
BUN/CREAT SERPL: 33 (ref 12–20)
BUN/CREAT SERPL: 33 (ref 12–20)
CALCIUM SERPL-MCNC: 11 MG/DL (ref 8.5–10.1)
CALCIUM SERPL-MCNC: 11.1 MG/DL (ref 8.5–10.1)
CALCIUM SERPL-MCNC: 12.2 MG/DL (ref 8.5–10.1)
CAOX CRY URNS QL MICRO: ABNORMAL
CHLORIDE SERPL-SCNC: 127 MMOL/L (ref 97–108)
CHLORIDE SERPL-SCNC: 129 MMOL/L (ref 97–108)
CHLORIDE SERPL-SCNC: 130 MMOL/L (ref 97–108)
CO2 SERPL-SCNC: 29 MMOL/L (ref 21–32)
CO2 SERPL-SCNC: 29 MMOL/L (ref 21–32)
CO2 SERPL-SCNC: 32 MMOL/L (ref 21–32)
COLOR UR: ABNORMAL
COMMENT:: NORMAL
COMMENT:: NORMAL
CREAT SERPL-MCNC: 1.26 MG/DL (ref 0.55–1.02)
CREAT SERPL-MCNC: 1.3 MG/DL (ref 0.55–1.02)
CREAT SERPL-MCNC: 1.53 MG/DL (ref 0.55–1.02)
DIFFERENTIAL METHOD BLD: ABNORMAL
EOSINOPHIL # BLD: 0 K/UL (ref 0–0.4)
EOSINOPHIL NFR BLD: 0 % (ref 0–7)
EPITH CASTS URNS QL MICRO: ABNORMAL /LPF
ERYTHROCYTE [DISTWIDTH] IN BLOOD BY AUTOMATED COUNT: 15.7 % (ref 11.5–14.5)
GLOBULIN SER CALC-MCNC: 4.8 G/DL (ref 2–4)
GLUCOSE BLD-MCNC: 106 MG/DL (ref 65–100)
GLUCOSE SERPL-MCNC: 106 MG/DL (ref 65–100)
GLUCOSE SERPL-MCNC: 120 MG/DL (ref 65–100)
GLUCOSE SERPL-MCNC: 129 MG/DL (ref 65–100)
GLUCOSE UR STRIP.AUTO-MCNC: NEGATIVE MG/DL
HCT VFR BLD AUTO: 47.3 % (ref 35–47)
HGB BLD-MCNC: 14 G/DL (ref 11.5–16)
HGB UR QL STRIP: NEGATIVE
IMM GRANULOCYTES # BLD AUTO: 0.1 K/UL (ref 0–0.04)
IMM GRANULOCYTES NFR BLD AUTO: 1 % (ref 0–0.5)
KETONES UR QL STRIP.AUTO: NEGATIVE MG/DL
LEUKOCYTE ESTERASE UR QL STRIP.AUTO: ABNORMAL
LYMPHOCYTES # BLD: 1 K/UL (ref 0.8–3.5)
LYMPHOCYTES NFR BLD: 14 % (ref 12–49)
MCH RBC QN AUTO: 29.4 PG (ref 26–34)
MCHC RBC AUTO-ENTMCNC: 29.6 G/DL (ref 30–36.5)
MCV RBC AUTO: 99.4 FL (ref 80–99)
MONOCYTES # BLD: 0.4 K/UL (ref 0–1)
MONOCYTES NFR BLD: 5 % (ref 5–13)
NEUTS SEG # BLD: 5.4 K/UL (ref 1.8–8)
NEUTS SEG NFR BLD: 80 % (ref 32–75)
NITRITE UR QL STRIP.AUTO: NEGATIVE
NRBC # BLD: 0 K/UL (ref 0–0.01)
NRBC BLD-RTO: 0 PER 100 WBC
PH UR STRIP: 5.5 (ref 5–8)
PLATELET # BLD AUTO: 127 K/UL (ref 150–400)
PMV BLD AUTO: 12 FL (ref 8.9–12.9)
POTASSIUM SERPL-SCNC: 3.7 MMOL/L (ref 3.5–5.1)
POTASSIUM SERPL-SCNC: 3.9 MMOL/L (ref 3.5–5.1)
POTASSIUM SERPL-SCNC: 4.4 MMOL/L (ref 3.5–5.1)
PROT SERPL-MCNC: 7.1 G/DL (ref 6.4–8.2)
PROT UR STRIP-MCNC: ABNORMAL MG/DL
RBC # BLD AUTO: 4.76 M/UL (ref 3.8–5.2)
RBC #/AREA URNS HPF: ABNORMAL /HPF (ref 0–5)
SERVICE CMNT-IMP: ABNORMAL
SODIUM SERPL-SCNC: 158 MMOL/L (ref 136–145)
SODIUM SERPL-SCNC: 159 MMOL/L (ref 136–145)
SODIUM SERPL-SCNC: 161 MMOL/L (ref 136–145)
SP GR UR REFRACTOMETRY: 1.02
SPECIMEN HOLD: NORMAL
TROPONIN I SERPL HS-MCNC: 161 NG/L (ref 0–51)
TSH SERPL DL<=0.05 MIU/L-ACNC: 0.96 UIU/ML (ref 0.36–3.74)
UROBILINOGEN UR QL STRIP.AUTO: 1 EU/DL (ref 0.2–1)
WBC # BLD AUTO: 6.8 K/UL (ref 3.6–11)
WBC URNS QL MICRO: ABNORMAL /HPF (ref 0–4)

## 2023-11-01 PROCEDURE — 71045 X-RAY EXAM CHEST 1 VIEW: CPT

## 2023-11-01 PROCEDURE — 82962 GLUCOSE BLOOD TEST: CPT

## 2023-11-01 PROCEDURE — 2580000003 HC RX 258: Performed by: STUDENT IN AN ORGANIZED HEALTH CARE EDUCATION/TRAINING PROGRAM

## 2023-11-01 PROCEDURE — 83605 ASSAY OF LACTIC ACID: CPT

## 2023-11-01 PROCEDURE — 96360 HYDRATION IV INFUSION INIT: CPT

## 2023-11-01 PROCEDURE — 6370000000 HC RX 637 (ALT 250 FOR IP): Performed by: INTERNAL MEDICINE

## 2023-11-01 PROCEDURE — 84443 ASSAY THYROID STIM HORMONE: CPT

## 2023-11-01 PROCEDURE — 81001 URINALYSIS AUTO W/SCOPE: CPT

## 2023-11-01 PROCEDURE — 80053 COMPREHEN METABOLIC PANEL: CPT

## 2023-11-01 PROCEDURE — 80048 BASIC METABOLIC PNL TOTAL CA: CPT

## 2023-11-01 PROCEDURE — 36415 COLL VENOUS BLD VENIPUNCTURE: CPT

## 2023-11-01 PROCEDURE — 6360000002 HC RX W HCPCS: Performed by: INTERNAL MEDICINE

## 2023-11-01 PROCEDURE — 93005 ELECTROCARDIOGRAM TRACING: CPT | Performed by: STUDENT IN AN ORGANIZED HEALTH CARE EDUCATION/TRAINING PROGRAM

## 2023-11-01 PROCEDURE — 85025 COMPLETE CBC W/AUTO DIFF WBC: CPT

## 2023-11-01 PROCEDURE — 84484 ASSAY OF TROPONIN QUANT: CPT

## 2023-11-01 PROCEDURE — 99285 EMERGENCY DEPT VISIT HI MDM: CPT

## 2023-11-01 PROCEDURE — 2580000003 HC RX 258: Performed by: INTERNAL MEDICINE

## 2023-11-01 PROCEDURE — 96361 HYDRATE IV INFUSION ADD-ON: CPT

## 2023-11-01 PROCEDURE — 2060000000 HC ICU INTERMEDIATE R&B

## 2023-11-01 PROCEDURE — 70450 CT HEAD/BRAIN W/O DYE: CPT

## 2023-11-01 RX ORDER — ATROPINE SULFATE 0.1 MG/ML
1 INJECTION INTRAVENOUS ONCE
Status: COMPLETED | OUTPATIENT
Start: 2023-11-01 | End: 2023-11-01

## 2023-11-01 RX ORDER — TRAZODONE HYDROCHLORIDE 50 MG/1
25 TABLET ORAL NIGHTLY
COMMUNITY

## 2023-11-01 RX ORDER — ONDANSETRON 2 MG/ML
4 INJECTION INTRAMUSCULAR; INTRAVENOUS EVERY 6 HOURS PRN
Status: DISCONTINUED | OUTPATIENT
Start: 2023-11-01 | End: 2023-11-10 | Stop reason: HOSPADM

## 2023-11-01 RX ORDER — DEXTROSE AND SODIUM CHLORIDE 5; .45 G/100ML; G/100ML
INJECTION, SOLUTION INTRAVENOUS CONTINUOUS
Status: DISCONTINUED | OUTPATIENT
Start: 2023-11-01 | End: 2023-11-01

## 2023-11-01 RX ORDER — AMIODARONE HYDROCHLORIDE 200 MG/1
200 TABLET ORAL 2 TIMES DAILY
Status: DISCONTINUED | OUTPATIENT
Start: 2023-11-01 | End: 2023-11-08

## 2023-11-01 RX ORDER — ASPIRIN 81 MG/1
81 TABLET, CHEWABLE ORAL DAILY
Status: DISCONTINUED | OUTPATIENT
Start: 2023-11-01 | End: 2023-11-10 | Stop reason: HOSPADM

## 2023-11-01 RX ORDER — HEPARIN SODIUM 5000 [USP'U]/ML
5000 INJECTION, SOLUTION INTRAVENOUS; SUBCUTANEOUS 2 TIMES DAILY
Status: DISCONTINUED | OUTPATIENT
Start: 2023-11-02 | End: 2023-11-01

## 2023-11-01 RX ORDER — ASPIRIN 81 MG/1
81 TABLET, CHEWABLE ORAL DAILY
COMMUNITY

## 2023-11-01 RX ORDER — ATROPINE SULFATE 0.1 MG/ML
0.5 INJECTION INTRAVENOUS ONCE
Status: CANCELLED | OUTPATIENT
Start: 2023-11-01 | End: 2023-11-01

## 2023-11-01 RX ORDER — DEXTROSE MONOHYDRATE 50 MG/ML
INJECTION, SOLUTION INTRAVENOUS CONTINUOUS
Status: DISCONTINUED | OUTPATIENT
Start: 2023-11-01 | End: 2023-11-05

## 2023-11-01 RX ORDER — ACETAMINOPHEN 325 MG/1
650 TABLET ORAL EVERY 6 HOURS PRN
Status: DISCONTINUED | OUTPATIENT
Start: 2023-11-01 | End: 2023-11-10 | Stop reason: HOSPADM

## 2023-11-01 RX ORDER — METOPROLOL SUCCINATE 25 MG/1
25 TABLET, EXTENDED RELEASE ORAL DAILY
Status: DISCONTINUED | OUTPATIENT
Start: 2023-11-01 | End: 2023-11-03

## 2023-11-01 RX ORDER — ATORVASTATIN CALCIUM 20 MG/1
20 TABLET, FILM COATED ORAL NIGHTLY
Status: DISCONTINUED | OUTPATIENT
Start: 2023-11-01 | End: 2023-11-10 | Stop reason: HOSPADM

## 2023-11-01 RX ORDER — 0.9 % SODIUM CHLORIDE 0.9 %
1000 INTRAVENOUS SOLUTION INTRAVENOUS ONCE
Status: COMPLETED | OUTPATIENT
Start: 2023-11-01 | End: 2023-11-01

## 2023-11-01 RX ORDER — SODIUM CHLORIDE 0.9 % (FLUSH) 0.9 %
5-40 SYRINGE (ML) INJECTION PRN
Status: DISCONTINUED | OUTPATIENT
Start: 2023-11-01 | End: 2023-11-10 | Stop reason: HOSPADM

## 2023-11-01 RX ORDER — CASTOR OIL AND BALSAM, PERU 788; 87 MG/G; MG/G
OINTMENT TOPICAL 2 TIMES DAILY
Status: DISCONTINUED | OUTPATIENT
Start: 2023-11-01 | End: 2023-11-10 | Stop reason: HOSPADM

## 2023-11-01 RX ORDER — POLYETHYLENE GLYCOL 3350 17 G/17G
17 POWDER, FOR SOLUTION ORAL DAILY PRN
Status: DISCONTINUED | OUTPATIENT
Start: 2023-11-01 | End: 2023-11-10 | Stop reason: HOSPADM

## 2023-11-01 RX ORDER — ACETAMINOPHEN 650 MG/1
650 SUPPOSITORY RECTAL EVERY 6 HOURS PRN
Status: DISCONTINUED | OUTPATIENT
Start: 2023-11-01 | End: 2023-11-10 | Stop reason: HOSPADM

## 2023-11-01 RX ORDER — ONDANSETRON 4 MG/1
4 TABLET, ORALLY DISINTEGRATING ORAL EVERY 8 HOURS PRN
Status: DISCONTINUED | OUTPATIENT
Start: 2023-11-01 | End: 2023-11-10 | Stop reason: HOSPADM

## 2023-11-01 RX ORDER — SODIUM CHLORIDE 9 MG/ML
INJECTION, SOLUTION INTRAVENOUS PRN
Status: DISCONTINUED | OUTPATIENT
Start: 2023-11-01 | End: 2023-11-10 | Stop reason: HOSPADM

## 2023-11-01 RX ORDER — SODIUM CHLORIDE 0.9 % (FLUSH) 0.9 %
5-40 SYRINGE (ML) INJECTION EVERY 12 HOURS SCHEDULED
Status: DISCONTINUED | OUTPATIENT
Start: 2023-11-01 | End: 2023-11-10 | Stop reason: HOSPADM

## 2023-11-01 RX ORDER — TRAZODONE HYDROCHLORIDE 50 MG/1
25 TABLET ORAL NIGHTLY
Status: DISCONTINUED | OUTPATIENT
Start: 2023-11-01 | End: 2023-11-10 | Stop reason: HOSPADM

## 2023-11-01 RX ADMIN — SODIUM CHLORIDE 1000 ML: 9 INJECTION, SOLUTION INTRAVENOUS at 11:54

## 2023-11-01 RX ADMIN — ATROPINE SULFATE 1 MG: 0.1 INJECTION, SOLUTION INTRAVENOUS at 17:09

## 2023-11-01 RX ADMIN — DEXTROSE MONOHYDRATE: 50 INJECTION, SOLUTION INTRAVENOUS at 15:50

## 2023-11-01 ASSESSMENT — PAIN - FUNCTIONAL ASSESSMENT: PAIN_FUNCTIONAL_ASSESSMENT: FACE, LEGS, ACTIVITY, CRY, AND CONSOLABILITY (FLACC)

## 2023-11-01 NOTE — ED NOTES
Pt presents from group home via EMS, called by son who reports AMS upon visit today. Pt has been non-verbal and has not been eating, drinking, or taking meds for an unknown period of time. EMS reports glucose of 130 en route.       Yesenia Davila RN  11/01/23 7869

## 2023-11-01 NOTE — PROGRESS NOTES
[facility] 28 received from Pramod, all questions answered. 1600: This RN completed an assessment Dr. Christelle Raymundo notified of : Pt HR 40s and Temp 94.8;  patient would not follow commands to swallow the applesauce. Received orders to place patient on a warming blanket, change fluids to D5 at 100ml/hr, hold PO medications and make the patient NPO.     1700: HR now in the 30's; one time dose of atropine 1mg received from Dr. Christelle Raymundo.

## 2023-11-01 NOTE — H&P
Hospitalist Admission Note    NAME:   Karen Hernandez   : 1935   MRN: [id number]     Date/Time: 2023 1:51 PM    Patient PCP: Anand Wasserman MD    ______________________________________________________________________  Given the patient's current clinical presentation, I have a high level of concern for decompensation if discharged from the emergency department. Complex decision making was performed, which includes reviewing the patient's available past medical records, laboratory results, and x-ray films. My assessment of this patient's clinical condition and my plan of care is as follows. Assessment / Plan:      Hypernatremia  PHILLIP  Hypercalcemia  Failure to Thrive  Recent covid 2 weeks ago  -Admit to stepdown with telemetry monitoring  -Secondary to poor oral intake and dehydration  CT head unremarkable  -Status post 1 L IV fluid in the ER  -We will check BMP now  -Start on IV fluid depending upon sodium  Per previous documentation pureed diet  -We will consult diet, monitor intake  -BMP every 6 hours  -Hold losartan, spironolactone  -Monitor urine output/bladder check  PT OT eval  UA pending      Recent COVID 2 weeks ago  Patient currently on room air  Chest x-ray unremarkable  Symptomatic treatment if needed      Thrombocytopenia   likely secondary to recent COVID  No active bleeding  We will monitor    History of CVA  A-fib  CHF  -Continue aspirin, Eliquis, statin, amiodarone for  ? Amiodarone 200 twice daily, follow-up cardiology as outpatient for tapering  Valsartan, spironolactone on hold        Addendum 5:14 PM    Sodium came back this 161. Placed on D5W at 100 cc/h. BMP every 6 hours.   Nephrology consult    Hypothermia  -Placed on warming blanket    Bradycardia  EKG shows widened QRS complex  Heart rate dropping to 30s and going up to 50s  Likely from hypothermia  Patient lethargic/not a good historian  Held metoprolol  Atropine 1 mg IV x1  Cardiology consult      Medical

## 2023-11-02 LAB
ALBUMIN SERPL-MCNC: 1.9 G/DL (ref 3.5–5)
ALBUMIN/GLOB SERPL: 0.5 (ref 1.1–2.2)
ALP SERPL-CCNC: 73 U/L (ref 45–117)
ALT SERPL-CCNC: 46 U/L (ref 12–78)
ANION GAP SERPL CALC-SCNC: 1 MMOL/L (ref 5–15)
ANION GAP SERPL CALC-SCNC: 5 MMOL/L (ref 5–15)
AST SERPL-CCNC: 44 U/L (ref 15–37)
BASOPHILS # BLD: 0 K/UL (ref 0–0.1)
BASOPHILS NFR BLD: 0 % (ref 0–1)
BILIRUB SERPL-MCNC: 0.7 MG/DL (ref 0.2–1)
BUN SERPL-MCNC: 35 MG/DL (ref 6–20)
BUN SERPL-MCNC: 41 MG/DL (ref 6–20)
BUN/CREAT SERPL: 29 (ref 12–20)
BUN/CREAT SERPL: 29 (ref 12–20)
CALCIUM SERPL-MCNC: 10.4 MG/DL (ref 8.5–10.1)
CALCIUM SERPL-MCNC: 11.2 MG/DL (ref 8.5–10.1)
CHLORIDE SERPL-SCNC: 119 MMOL/L (ref 97–108)
CHLORIDE SERPL-SCNC: 128 MMOL/L (ref 97–108)
CO2 SERPL-SCNC: 28 MMOL/L (ref 21–32)
CO2 SERPL-SCNC: 28 MMOL/L (ref 21–32)
CREAT SERPL-MCNC: 1.2 MG/DL (ref 0.55–1.02)
CREAT SERPL-MCNC: 1.41 MG/DL (ref 0.55–1.02)
DIFFERENTIAL METHOD BLD: ABNORMAL
EOSINOPHIL # BLD: 0 K/UL (ref 0–0.4)
EOSINOPHIL NFR BLD: 1 % (ref 0–7)
ERYTHROCYTE [DISTWIDTH] IN BLOOD BY AUTOMATED COUNT: 15.8 % (ref 11.5–14.5)
GLOBULIN SER CALC-MCNC: 4 G/DL (ref 2–4)
GLUCOSE SERPL-MCNC: 119 MG/DL (ref 65–100)
GLUCOSE SERPL-MCNC: 134 MG/DL (ref 65–100)
HCT VFR BLD AUTO: 39.6 % (ref 35–47)
HGB BLD-MCNC: 11.6 G/DL (ref 11.5–16)
IMM GRANULOCYTES # BLD AUTO: 0 K/UL (ref 0–0.04)
IMM GRANULOCYTES NFR BLD AUTO: 0 % (ref 0–0.5)
LACTATE BLD-SCNC: 1.52 MMOL/L (ref 0.4–2)
LYMPHOCYTES # BLD: 0.9 K/UL (ref 0.8–3.5)
LYMPHOCYTES NFR BLD: 15 % (ref 12–49)
MCH RBC QN AUTO: 29.4 PG (ref 26–34)
MCHC RBC AUTO-ENTMCNC: 29.3 G/DL (ref 30–36.5)
MCV RBC AUTO: 100.3 FL (ref 80–99)
MONOCYTES # BLD: 0.3 K/UL (ref 0–1)
MONOCYTES NFR BLD: 6 % (ref 5–13)
NEUTS SEG # BLD: 4.4 K/UL (ref 1.8–8)
NEUTS SEG NFR BLD: 78 % (ref 32–75)
NRBC # BLD: 0 K/UL (ref 0–0.01)
NRBC BLD-RTO: 0 PER 100 WBC
PHOSPHATE SERPL-MCNC: 2 MG/DL (ref 2.6–4.7)
PLATELET # BLD AUTO: 133 K/UL (ref 150–400)
PMV BLD AUTO: 12.1 FL (ref 8.9–12.9)
POTASSIUM SERPL-SCNC: 3.3 MMOL/L (ref 3.5–5.1)
POTASSIUM SERPL-SCNC: 3.8 MMOL/L (ref 3.5–5.1)
PROT SERPL-MCNC: 5.9 G/DL (ref 6.4–8.2)
RBC # BLD AUTO: 3.95 M/UL (ref 3.8–5.2)
SODIUM SERPL-SCNC: 152 MMOL/L (ref 136–145)
SODIUM SERPL-SCNC: 157 MMOL/L (ref 136–145)
WBC # BLD AUTO: 5.6 K/UL (ref 3.6–11)

## 2023-11-02 PROCEDURE — 51701 INSERT BLADDER CATHETER: CPT

## 2023-11-02 PROCEDURE — 6370000000 HC RX 637 (ALT 250 FOR IP): Performed by: INTERNAL MEDICINE

## 2023-11-02 PROCEDURE — 82306 VITAMIN D 25 HYDROXY: CPT

## 2023-11-02 PROCEDURE — 51798 US URINE CAPACITY MEASURE: CPT

## 2023-11-02 PROCEDURE — 85025 COMPLETE CBC W/AUTO DIFF WBC: CPT

## 2023-11-02 PROCEDURE — 2580000003 HC RX 258: Performed by: INTERNAL MEDICINE

## 2023-11-02 PROCEDURE — 84100 ASSAY OF PHOSPHORUS: CPT

## 2023-11-02 PROCEDURE — 82652 VIT D 1 25-DIHYDROXY: CPT

## 2023-11-02 PROCEDURE — 2060000000 HC ICU INTERMEDIATE R&B

## 2023-11-02 PROCEDURE — 97162 PT EVAL MOD COMPLEX 30 MIN: CPT

## 2023-11-02 PROCEDURE — 92610 EVALUATE SWALLOWING FUNCTION: CPT

## 2023-11-02 PROCEDURE — 80053 COMPREHEN METABOLIC PANEL: CPT

## 2023-11-02 PROCEDURE — 97165 OT EVAL LOW COMPLEX 30 MIN: CPT

## 2023-11-02 PROCEDURE — 83970 ASSAY OF PARATHORMONE: CPT

## 2023-11-02 PROCEDURE — 97530 THERAPEUTIC ACTIVITIES: CPT

## 2023-11-02 PROCEDURE — 36415 COLL VENOUS BLD VENIPUNCTURE: CPT

## 2023-11-02 PROCEDURE — 6360000002 HC RX W HCPCS: Performed by: INTERNAL MEDICINE

## 2023-11-02 RX ORDER — ASPIRIN 81 MG/1
81 TABLET, CHEWABLE ORAL DAILY
Status: CANCELLED | OUTPATIENT
Start: 2023-11-03

## 2023-11-02 RX ORDER — ENOXAPARIN SODIUM 100 MG/ML
1 INJECTION SUBCUTANEOUS DAILY
Status: DISCONTINUED | OUTPATIENT
Start: 2023-11-02 | End: 2023-11-02

## 2023-11-02 RX ORDER — ATORVASTATIN CALCIUM 20 MG/1
20 TABLET, FILM COATED ORAL NIGHTLY
Status: CANCELLED | OUTPATIENT
Start: 2023-11-02

## 2023-11-02 RX ORDER — HEPARIN SODIUM 5000 [USP'U]/ML
5000 INJECTION, SOLUTION INTRAVENOUS; SUBCUTANEOUS EVERY 8 HOURS SCHEDULED
Status: DISCONTINUED | OUTPATIENT
Start: 2023-11-02 | End: 2023-11-03

## 2023-11-02 RX ADMIN — Medication: at 21:40

## 2023-11-02 RX ADMIN — SODIUM CHLORIDE, PRESERVATIVE FREE 10 ML: 5 INJECTION INTRAVENOUS at 21:55

## 2023-11-02 RX ADMIN — DEXTROSE MONOHYDRATE: 50 INJECTION, SOLUTION INTRAVENOUS at 21:59

## 2023-11-02 RX ADMIN — HEPARIN SODIUM 5000 UNITS: 5000 INJECTION INTRAVENOUS; SUBCUTANEOUS at 21:54

## 2023-11-02 RX ADMIN — DEXTROSE MONOHYDRATE: 50 INJECTION, SOLUTION INTRAVENOUS at 12:23

## 2023-11-02 RX ADMIN — SODIUM CHLORIDE, PRESERVATIVE FREE 10 ML: 5 INJECTION INTRAVENOUS at 01:48

## 2023-11-02 RX ADMIN — DEXTROSE MONOHYDRATE: 50 INJECTION, SOLUTION INTRAVENOUS at 01:46

## 2023-11-02 RX ADMIN — SODIUM CHLORIDE, PRESERVATIVE FREE 10 ML: 5 INJECTION INTRAVENOUS at 12:22

## 2023-11-02 NOTE — PROGRESS NOTES
Hospitalist Progress Note    NAME: Angelique Cortes   : 1935   MRN: [id number]     Date/Time: 2023 3:32 PM  Patient PCP: Tunde Da Silva MD    Estimated discharge date:  >46hrs  Anticipated Disposition / Barriers:      Assessment / Plan:     Hypernatremia (Na 161)  PHILLIP  Hypercalcemia  -Admit to stepdown with telemetry monitoring  -Secondary to poor oral intake and dehydration  -Status post 1 L IV fluid in the ER  -Hold losartan, spironolactone  -continue hypotonic IVFs  -nephrology following. Hypercalcemia work up ordered     Failure to Thrive  Acute metabolic encephalopathy  -multifactorial from elevated Na, dehydration, hypothermia  -CT head negative  -AMS better but not safe yet for PO  -remains NPO. Baseline on pureed  -PT OT when more alert    Recent COVID 2 weeks ago  Patient currently on room air  Chest x-ray unremarkable  Symptomatic treatment if needed     Thrombocytopenia, mild  -likely secondary to recent COVID  -No active bleeding - monitor    History of CVA  A-fib  CHF  -Continue aspirin, Eliquis, statin, amiodarone if able to tolerate   -metoprolol on hold due to bradycardia on admission  -Valsartan, spironolactone on hold due to PHILLIP  -change eliquis to lovenox     Hypothermia  -warming blanket prn     Bradycardia  EKG shows widened QRS complex  Heart rate dropping to 30s and going up to 50s  Likely from hypothermia  Patient lethargic/not a good historian  Held metoprolol  Atropine 1 mg IV x1  Cardiology consult          Medical Decision Making:   I personally reviewed labs:  CBC, BMP  I personally reviewed imaging:  Toxic drug monitoring:   Discussed case with: , lead RN during today's interdisciplinary rounds. Code status: Full  Prophylaxis:  Eliquis    Subjective:     Chief Complaint / Reason for Physician Visit  Follow up of hypernatremia, dehydration, Hx CVA Afib  Discussed with RN events overnight.        Objective:     VITALS:   Most recent are:  Visit creation of Plan. LABS:  I reviewed today's most current labs and imaging studies.   Pertinent labs include:  Recent Labs     11/01/23  1131 11/02/23  0250   WBC 6.8 5.6   HGB 14.0 11.6   HCT 47.3* 39.6   * 133*     Recent Labs     11/01/23  1131 11/01/23  1450 11/01/23 2056 11/02/23  0250   * 161* 158* 157*   K 3.9 4.4 3.7 3.8   * 130* 129* 128*   CO2 32 29 29 28   BUN 48* 43* 41* 41*   PHOS  --   --   --  2.0*   ALT 56  --   --  46

## 2023-11-02 NOTE — PROGRESS NOTES
plan of care was discussed with: occupational therapist and registered nurse    Patient Education  Education Given To: Patient  Education Provided: Role of Therapy  Education Method: Verbal  Barriers to Learning: Cognition  Education Outcome: Unable to demonstrate understanding    Thank you for this referral.  Manav Steve, PT, DPT  Minutes: 10      Physical Therapy Evaluation Charge Determination   History Examination Presentation Decision-Making   MEDIUM  Complexity : 1-2 comorbidities / personal factors will impact the outcome/ POC  MEDIUM Complexity : 3 Standardized tests and measures addressin body structure, function, activity limitation and / or participation in recreation  MEDIUM Complexity : Evolving with changing characteristics  AM-PAC  MEDIUM     Based on the above components, the patient evaluation is determined to be of the following complexity level: Medium

## 2023-11-02 NOTE — PROGRESS NOTES
OCCUPATIONAL THERAPY EVALUATION/DISCHARGE  Patient: Vaibhav Peterson (89 y.o. female)  Date: 11/2/2023  Primary Diagnosis: Hypernatremia [E87.0]         Precautions:                    ASSESSMENT :  Based on the objective data below, the patient's functional performance is limited by grossly decreased strength (R>L due to previous stroke), impaired AROM and coordination, R sided neglect and generalized inattention, no command following, and aphasia following admission for hypernatremia. Pt presents from [facility] where she received assistance for all ADLs. Per chart pt with PMH of CVA with aphasia and R sided deficits in June 2023. She was received supine in bed. Pt intermittently mumbling, easily distracted and not attending to therapists for majority of session even to L side (pt with L gaze, rarely attending to midline/R). She was resistant to PROM and did not follow commands for AROM during session, although observed pt spontaneously moving BUEs. Required total A for all ADLs and bed mobility during session. She remained in bed at end of session with VSS. Pt is not appropriate for participation in acute therapy services at this time. Per CM note in chart pt is able to return to [facility] at discharge. Functional Outcome Measure: The patient scored 0/100 on the Barthel Index outcome measure. Further skilled acute occupational therapy is not indicated at this time.      PLAN :  Recommendation for discharge: (in order for the patient to meet his/her long term goals): Return to [facility] with continued assistance    Other factors to consider for discharge: impaired cognition, fall risk, presents from [facility]    IF patient discharges home will need the following DME: patient owns DME required for discharge       SUBJECTIVE:   Patient intermittently mumbling    OBJECTIVE DATA SUMMARY:     Past Medical History:   Diagnosis Date    DJD (degenerative joint disease)     Hypercalcemia     Hypertension     Vitamin D deficiency Pain Rating:  Pt reported no pain during session    Activity Tolerance:   Poor    After treatment:   Patient left in no apparent distress in bed, Call bell within reach, Bed/ chair alarm activated, and Side rails x3    COMMUNICATION/EDUCATION:   The patient's plan of care was discussed with: physical therapist and registered nurse         Thank you for this referral.  Mina Larsen OT  Minutes: 17    Occupational Therapy Evaluation Charge Determination   History Examination Decision-Making   LOW Complexity : Brief history review  MEDIUM Complexity: 3-5 Performance deficits relating to physical, cognitive, or psychosocial skills that result in activity limitations and/or participation restrictions MEDIUM Complexity: Patient may present with comorbidities that affect occupational performance.  Minimal to moderate modifications of tasks or assist (eg. physical or verbal) with assist is necessary to enable pt to complete eval   Based on the above components, the patient evaluation is determined to be of the following complexity level: Low

## 2023-11-02 NOTE — PROGRESS NOTES
Comprehensive Nutrition Assessment    Type and Reason for Visit:  Initial, Consult, Positive Nutrition Screen    Nutrition Recommendations/Plan:   Diet pending clearance from SLP     Malnutrition Assessment:  Malnutrition Status:  Severe malnutrition (11/02/23 1459)    Context:  Chronic Illness     Findings of the 6 clinical characteristics of malnutrition:  Energy Intake:  Mild decrease in energy intake (Comment)  Weight Loss:  Greater than 10% over 6 months     Body Fat Loss:  Severe body fat loss Orbital   Muscle Mass Loss:  Severe muscle mass loss Temples (temporalis), Clavicles (pectoralis & deltoids)  Fluid Accumulation:  No significant fluid accumulation     Strength:  Not Performed    Nutrition Assessment:     Chart reviewed for MST and consult for poor PO intake. Pt medically noted for AMS, not eating/drinking recently at group home, hypernatremia, PHILLIP, hypercalcemia, FTT, recent covid, hx of CVA, afib and CHF. Pt awake at time of visit, looking around, but would not respond to me verbally. No family present. Per EMR, she has lost a significant amount of weight over the last few months (up to 25#, 19% since June) and presents with severe fat and muscle wasting. Wounds present as well. Pt remains NPO at this time per SLP recommendations. RD will continue to monitor. Wt Readings from Last 5 Encounters:   11/01/23 49.6 kg (109 lb 5.6 oz)   10/07/23 59 kg (130 lb)   07/04/23 59 kg (130 lb)   06/26/23 60.8 kg (134 lb)   04/14/21 69.9 kg (154 lb)   ]    Nutrition Related Findings:    Labs: Na 157, Cr 1.41, -129, phos 2.0. Meds: lipitor, D5.   BM PTA. Wound Type: Unstageable, Deep Tissue Injury       Current Nutrition Intake & Therapies:    Average Meal Intake: NPO  Average Supplements Intake: NPO  Diet NPO    Anthropometric Measures:  Height: 152.4 cm (5')  Ideal Body Weight (IBW): 100 lbs (45 kg)       Current Body Weight: 49.6 kg (109 lb 5.6 oz), 109.3 % IBW.  Weight Source: Bed Scale  Current BMI (kg/m2): 21.4        Weight Adjustment For: No Adjustment                 BMI Categories: Underweight (BMI less than 22) age over 72    Estimated Daily Nutrient Needs:  Energy Requirements Based On: Formula  Weight Used for Energy Requirements: Current  Energy (kcal/day): 1122 kcals (BMR x 1. 3AF)  Weight Used for Protein Requirements: Current  Protein (g/day): 60-69g (1.2-1.4g/kg)  Method Used for Fluid Requirements: 1 ml/kcal  Fluid (ml/day): 1200mL    Nutrition Diagnosis:   Inadequate protein-energy intake related to cognitive or neurological impairment as evidenced by NPO or clear liquid status due to medical condition    Nutrition Interventions:   Food and/or Nutrient Delivery: Start Oral Diet  Nutrition Education/Counseling: No recommendation at this time  Coordination of Nutrition Care: Continue to monitor while inpatient       Goals:     Goals: Initiate PO diet, by next RD assessment, PO intake 50% or greater       Nutrition Monitoring and Evaluation:   Behavioral-Environmental Outcomes: None Identified  Food/Nutrient Intake Outcomes: Diet Advancement/Tolerance, IVF Intake  Physical Signs/Symptoms Outcomes: Biochemical Data, GI Status, Nutrition Focused Physical Findings, Skin, Weight    Discharge Planning:     Too soon to determine     Saulo Goodman, RD  Contact:

## 2023-11-02 NOTE — PROGRESS NOTES
9497  Bedside shift change report received from [address] [place] [address] Avenue, RN (offgoing nurse). Assumed care of pt at this time. Report included the following information SBAR, Kardex, Intake/Output, MAR, Recent Results, Heart rhythm and  Medications. [facility], beatrice and oral care rendered. Mucous, no urine seen, Pure-Wick changed. Will bladder scan (needed I/O overnight). 1500  Dr. Britany Neri in to see. Discussed labs and overall needs. No urine output since last I/O cath early AM. Bladder scan 260 ml's currently. Dr. Britany Neri is aware. Expected 2/2 dehydration. Follow routine output management with I/O cath as needed. 1600  Assessment unchanged. Labs sent. Oral and beatrice care administered. No void at this time. MS aware, continue with bladder scans.

## 2023-11-02 NOTE — ED PROVIDER NOTES
Creatinine 1.53 (H) 0.55 - 1.02 MG/DL    Bun/Cre Ratio 31 (H) 12 - 20      Est, Glom Filt Rate 33 (L) >60 ml/min/1.73m2    Calcium 12.2 (H) 8.5 - 10.1 MG/DL    Total Bilirubin 0.9 0.2 - 1.0 MG/DL    ALT 56 12 - 78 U/L    AST 45 (H) 15 - 37 U/L    Alk Phosphatase 90 45 - 117 U/L    Total Protein 7.1 6.4 - 8.2 g/dL    Albumin 2.3 (L) 3.5 - 5.0 g/dL    Globulin 4.8 (H) 2.0 - 4.0 g/dL    Albumin/Globulin Ratio 0.5 (L) 1.1 - 2.2     TSH    Collection Time: 11/01/23 11:31 AM   Result Value Ref Range    TSH, 3RD GENERATION 0.96 0.36 - 3.74 uIU/mL   Extra Tubes Hold    Collection Time: 11/01/23 11:31 AM   Result Value Ref Range    Specimen HOld PST     Comment:        Add-on orders for these samples will be processed based on acceptable specimen integrity and analyte stability, which may vary by analyte. EKG 12 Lead    Collection Time: 11/01/23 11:43 AM   Result Value Ref Range    Ventricular Rate 69 BPM    Atrial Rate 65 BPM    QRS Duration 142 ms    Q-T Interval 548 ms    QTc Calculation (Bazett) 587 ms    R Axis -46 degrees    T Axis 24 degrees    Diagnosis       Wide QRS rhythm  Right bundle branch block  Left anterior fascicular block  ** Bifascicular block **  Voltage criteria for left ventricular hypertrophy  T wave abnormality, consider lateral ischemia  When compared with ECG of 25-JUN-2023 23:09,  Wide QRS rhythm has replaced Sinus rhythm  Vent.  rate has decreased BY  62 BPM     Urinalysis    Collection Time: 11/01/23  2:15 PM   Result Value Ref Range    Color, UA DARK YELLOW      Appearance CLEAR CLEAR      Specific Gravity, UA 1.022      pH, Urine 5.5 5.0 - 8.0      Protein, UA TRACE (A) NEG mg/dL    Glucose, UA Negative NEG mg/dL    Ketones, Urine Negative NEG mg/dL    Blood, Urine Negative NEG      Urobilinogen, Urine 1.0 0.2 - 1.0 EU/dL    Nitrite, Urine Negative NEG      Leukocyte Esterase, Urine MODERATE (A) NEG      WBC, UA 10-20 0 - 4 /hpf    RBC, UA 0-5 0 - 5 /hpf    Epithelial Cells UA MODERATE (A) Received 1L fluid bolus. No new CT head findings, CXR clear, UA, will admit to hospitalist.    ED Course as of 11/02/23 1032   Wed Nov 01, 2023   1330 Discussed with hospitalist for admission [NM]      ED Course User Index  [NM] Martin Srivastava DO       Disposition Considerations (Tests not done, Shared Decision Making, Pt Expectation of Test or Tx.):      FINAL IMPRESSION     1. Hypernatremia    2. Dehydration          DISPOSITION/PLAN   DISPOSITION Admitted 11/01/2023 01:49:40 PM      Admit Note: Pt is being admitted by Dr Justino Ross. The results of their tests and reason(s) for their admission have been discussed with pt and/or available family. They convey agreement and understanding for the need to be admitted and for the admission diagnosis. PATIENT REFERRED TO:  No follow-up provider specified. DISCHARGE MEDICATIONS:     Medication List        ASK your doctor about these medications      acetaminophen 325 MG tablet  Commonly known as: TYLENOL     amiodarone 200 MG tablet  Commonly known as: CORDARONE  Take 1 tablet by mouth 2 times daily     apixaban 2.5 MG Tabs tablet  Commonly known as: ELIQUIS  Take 1 tablet by mouth 2 times daily     aspirin 81 MG chewable tablet     atorvastatin 20 MG tablet  Commonly known as: LIPITOR  Take 1 tablet by mouth nightly     metoprolol succinate 25 MG extended release tablet  Commonly known as: TOPROL XL  Take 1 tablet by mouth daily     spironolactone 25 MG tablet  Commonly known as: ALDACTONE  Take 0.5 tablets by mouth daily     traZODone 50 MG tablet  Commonly known as: DESYREL     valsartan 160 MG tablet  Commonly known as: DIOVAN                DISCONTINUED MEDICATIONS:  Current Discharge Medication List          I am the Primary Clinician of Record. Hannah Rivera DO (electronically signed)      (Please note that parts of this dictation were completed with voice recognition software.  Quite often unanticipated grammatical, syntax, homophones, and other

## 2023-11-02 NOTE — PLAN OF CARE
Speech LAnguage Pathology EVALUATION    Patient: Candice Marin (61 y.o. female)  Date: 11/2/2023  Primary Diagnosis: Hypernatremia [E87.0]       Precautions: Swallow                    ASSESSMENT :  Based on the objective data described below, the patient presents with moderately severe oral dysphagia and presumed grossly functional pharyngeal swallow. Note patient observed with copious dried secretions observed coating patient's lips, tongue, and hard palate. Dentures removed, cleaned, and placed in labeled container. Oral care completed with use of suction toothbrush prior to PO trials. Patient accepted single ice chips via spoon. Slow mastication, resulting in suspected slow posterior propulsion, of ice chips observed. Patient tolerated thin liquid trials via tsp without overt signs of aspiration. Patient demonstrated difficulty initiating straw sip on this date despite maximal verbal and tactile cues. Cup sip provided by SLP. Patient observed with decreased lip seal for bolus acceptance. Trial of puree resulted in patient with oral holding leading to absent oral manipulation and absent posterior propulsion, requiring SLP to suction entirety of bolus from patient's oral cavity. At this time, NPO remains the safest option. Consider non-oral route for medication. Note patient with significant mixed aphasia s/p stroke as well as history of dementia at baseline. Patient will benefit from skilled intervention to address the above impairments. PLAN :  Recommendations and Planned Interventions:  Diet: NPO  -- Consider non-oral route for medication  -- Stringent oral care 2-3x/day with use of suction toothbrush  -- Ice chips OK with RN       Acute SLP Services: Yes, patient will be followed by speech-language pathology 3x/week to address goals. Patient's rehabilitation potential is considered to be Fair.     Discharge Recommendations: Continue to assess pending progress     SUBJECTIVE:   Patient with no 1--Nothing by mouth (NPO)    After treatment:   Patient left in no apparent distress in bed, Call bell left within reach, and Nursing notified    COMMUNICATION/EDUCATION:   Patient was educated regarding role of SLP. She demonstrated Poor understanding due to history of aphasia and dementia. The patient's plan of care including recommendations, planned interventions, and recommended diet changes were discussed with: Registered nurse and Physician    Patient is unable to participate in goal setting and plan of care    Thank you,  Rosas Green SLP  Minutes: 20     Problem: SLP Adult - Impaired Swallowing  Goal: By Discharge: Advance to least restrictive diet without signs or symptoms of aspiration for planned discharge setting. See evaluation for individualized goals. Description: Speech Pathology Goals  Initiated 11/2/2023    1. Patient will participate in re-evaluation of swallow function within 7 days.   Outcome: Progressing

## 2023-11-02 NOTE — CONSULTS
Consultation Note    NAME: Berto Barreto   :  1935   MRN:  [id number]     Date/Time:  2023 6:00 AM    I have been asked to see this patient by Dr. Salvatore Degroot  for advice/opinion re: hypernatremia. Assessment :    Plan:  Hypernatremia, recurrent  PHILLIP  Hypercalcemia  Mild thrombocytopenia  Soft BP and bradycardia at times  Hypothermia  Recent covid Na peak 161, now 157; agree with D5w at 100    Creatinine ranging from 1.3 to 1.5 - prerenal    cCa 12.9; phos is low; agree with IVF's, will check PTH, 25 vd, 1,25 vd; immobility related? Agree with holding ARB/aldactone    Will follow       Subjective:   CHIEF COMPLAINT:  phillip    HISTORY OF PRESENT ILLNESS:     Alvin Hartley is a 82 y.o.   female who has a history of the below. Ms. Lola Albrecht was sleeping and cursed me when I woke her and then went back to sleep. Chart reviewed. Past Medical History:   Diagnosis Date    DJD (degenerative joint disease)     Hypercalcemia     Hypertension     Vitamin D deficiency       Past Surgical History:   Procedure Laterality Date    CARPAL TUNNEL RELEASE Right     PARTIAL HYSTERECTOMY (CERVIX NOT REMOVED)       Social History     Tobacco Use    Smoking status: Former     Packs/day: .5     Types: Cigarettes     Quit date: 1988     Years since quittin.8    Smokeless tobacco: Never   Substance Use Topics    Alcohol use: No     Alcohol/week: 0.0 standard drinks of alcohol      Family History   Problem Relation Age of Onset    Hypertension Mother     Other Neg Hx         high calcium or kidney stones      Allergies   Allergen Reactions    Hydrochlorothiazide Other (See Comments)     Will avoid as she has hyperparathyroidism to not make calcium levels worse      Prior to Admission medications    Medication Sig Start Date End Date Taking?  Authorizing Provider   aspirin 81 MG chewable tablet Take 1 tablet by mouth daily   Yes Provider, MD Ara   traZODone (DESYREL) 50 MG tablet Take 0.5 tablets by mouth

## 2023-11-02 NOTE — CARE COORDINATION
Care Management Initial Assessment       RUR: 18%  Readmission? No  1st IM letter given? Yes - 11/1/23  1st  letter given: No       11/02/23 1442   Service Assessment   Patient Orientation Unable to Assess   Cognition Dementia / Early Alzheimer's   History Provided By Child/Family   Primary Caregiver   (Caregivers at [facility])   [address]; Other (Comment)  (Staff at [facility])   [address] is: Legal Next of Kin  (Pt's son Dorothy Moody)   PCP Verified by CM Yes   Last Visit to PCP Within last 6 months   Prior Functional Level Assistance with the following:;Bathing;Dressing; Toileting;Feeding;Cooking;Housework; Shopping;Mobility   Current Functional Level Assistance with the following:;Bathing;Dressing; Toileting;Feeding;Cooking;Housework; Shopping;Mobility   Can patient return to prior living arrangement Yes   Family able to assist with home care needs: No   Would you like for me to discuss the discharge plan with any other family members/significant others, and if so, who? Yes  (Pt's lakisha Moody)   [facility]   Social/Functional History   Lives With Other (comment)  (Assisted Living)   Type of Home Assisted living   [address] [place] [address] No   Patient's  Info Pt's family or Assisted Living   Discharge Planning   Type of Residence Assisted living   Current Services Prior To Admission Other (Comment)  (Assisted living)   Patient expects to be discharged to: Assisted living     CM introduce self, explain role and confirmed demographics with pt's lakisha Moody via phone. Pt resides at [facility]. Pt has no hx of home health, SNF or inpatient rehab. Pt uses the pharmacy at [facility]. At the time of d/c transportation TBD. CM will follow and assist with d/c planning.     Dora Waters manager

## 2023-11-03 LAB
25(OH)D3 SERPL-MCNC: 10.5 NG/ML (ref 30–100)
ANION GAP SERPL CALC-SCNC: 1 MMOL/L (ref 5–15)
ANION GAP SERPL CALC-SCNC: 2 MMOL/L (ref 5–15)
BUN SERPL-MCNC: 28 MG/DL (ref 6–20)
BUN SERPL-MCNC: 31 MG/DL (ref 6–20)
BUN/CREAT SERPL: 25 (ref 12–20)
BUN/CREAT SERPL: 28 (ref 12–20)
CALCIUM SERPL-MCNC: 10.1 MG/DL (ref 8.5–10.1)
CALCIUM SERPL-MCNC: 10.5 MG/DL (ref 8.5–10.1)
CALCIUM SERPL-MCNC: 9.3 MG/DL (ref 8.5–10.1)
CHLORIDE SERPL-SCNC: 117 MMOL/L (ref 97–108)
CHLORIDE SERPL-SCNC: 119 MMOL/L (ref 97–108)
CO2 SERPL-SCNC: 28 MMOL/L (ref 21–32)
CO2 SERPL-SCNC: 28 MMOL/L (ref 21–32)
CREAT SERPL-MCNC: 1.09 MG/DL (ref 0.55–1.02)
CREAT SERPL-MCNC: 1.1 MG/DL (ref 0.55–1.02)
EKG ATRIAL RATE: 65 BPM
EKG DIAGNOSIS: NORMAL
EKG Q-T INTERVAL: 548 MS
EKG QRS DURATION: 142 MS
EKG QTC CALCULATION (BAZETT): 587 MS
EKG R AXIS: -46 DEGREES
EKG T AXIS: 24 DEGREES
EKG VENTRICULAR RATE: 69 BPM
GLUCOSE SERPL-MCNC: 123 MG/DL (ref 65–100)
GLUCOSE SERPL-MCNC: 130 MG/DL (ref 65–100)
POTASSIUM SERPL-SCNC: 3.2 MMOL/L (ref 3.5–5.1)
POTASSIUM SERPL-SCNC: 3.6 MMOL/L (ref 3.5–5.1)
PTH-INTACT SERPL-MCNC: 186.2 PG/ML (ref 18.4–88)
SODIUM SERPL-SCNC: 146 MMOL/L (ref 136–145)
SODIUM SERPL-SCNC: 149 MMOL/L (ref 136–145)

## 2023-11-03 PROCEDURE — 51798 US URINE CAPACITY MEASURE: CPT

## 2023-11-03 PROCEDURE — 2580000003 HC RX 258: Performed by: INTERNAL MEDICINE

## 2023-11-03 PROCEDURE — 2060000000 HC ICU INTERMEDIATE R&B

## 2023-11-03 PROCEDURE — 36415 COLL VENOUS BLD VENIPUNCTURE: CPT

## 2023-11-03 PROCEDURE — 80048 BASIC METABOLIC PNL TOTAL CA: CPT

## 2023-11-03 PROCEDURE — 6370000000 HC RX 637 (ALT 250 FOR IP): Performed by: INTERNAL MEDICINE

## 2023-11-03 PROCEDURE — 92526 ORAL FUNCTION THERAPY: CPT

## 2023-11-03 PROCEDURE — 6360000002 HC RX W HCPCS: Performed by: INTERNAL MEDICINE

## 2023-11-03 RX ADMIN — DEXTROSE MONOHYDRATE: 50 INJECTION, SOLUTION INTRAVENOUS at 05:52

## 2023-11-03 RX ADMIN — DEXTROSE MONOHYDRATE: 50 INJECTION, SOLUTION INTRAVENOUS at 18:00

## 2023-11-03 RX ADMIN — Medication: at 20:16

## 2023-11-03 RX ADMIN — SODIUM CHLORIDE, PRESERVATIVE FREE 10 ML: 5 INJECTION INTRAVENOUS at 09:34

## 2023-11-03 RX ADMIN — APIXABAN 2.5 MG: 2.5 TABLET, FILM COATED ORAL at 20:15

## 2023-11-03 RX ADMIN — HEPARIN SODIUM 5000 UNITS: 5000 INJECTION INTRAVENOUS; SUBCUTANEOUS at 05:52

## 2023-11-03 RX ADMIN — SODIUM CHLORIDE, PRESERVATIVE FREE 10 ML: 5 INJECTION INTRAVENOUS at 20:16

## 2023-11-03 RX ADMIN — Medication: at 09:45

## 2023-11-03 NOTE — PROGRESS NOTES
0710- Bedside and Verbal shift change report given to Aidee Thompson RN (oncoming nurse) by Tish Perry. Nicolas Major RN (offgoing nurse). Report included the following information Nurse Handoff Report, Intake/Output, Recent Results, Cardiac Rhythm Sinus Guille Numbers, and Alarm Parameters. 4029- Shift assessment completed; see flow sheet for details. Pt able to arouse to voice; once awaken able to follow some simple commands intermittently; has some expressive aphasia but garbles some words at times. Currently in Sinus Alistair; stable BP; trace edema to BLE. Lungs are diminished throughout. ABD is soft; flat; pt remains NPO. Oliguria along with urinary retention; bladder scans Q6h and straight caths PRN. Skin is warm; dry. Sacral wound present and being treatment with Venelex ointment. Bilateral heel DTIs. Pt repositioned     56- Spoke with Dr. Eloisa Sofia at the bedside regarding oliguria; plans to continue with bladder checks Q6hr and straight cath if >400ml    1200- Reassessment completed; no acute changes in pt assessment. Repositioned     Bladder scan volume~345mL    1410- Family at the bedside; verbal update given     1620- Reassessment completed; see flow sheet for details. No acute changes noted     1735- Pt cleaned on incontinent urine; bladder scan completed~275mL. Repositioned     1930- Bedside and Verbal shift change report given to AVANI Major RN (oncoming nurse) by Aidee Thompson RN (offgoing nurse). Report included the following information Nurse Handoff Report, Intake/Output, Cardiac Rhythm Sinus Guille Numbers, and Alarm Parameters.

## 2023-11-03 NOTE — CARE COORDINATION
Transition of Care Plan:    RUR: 18%  Prior Level of Functioning: Assistance  Disposition: [facility]   If SNF or IPR: Date FOC offered:   Date FOC received:   Accepting facility:   Date authorization started with reference number:   Date authorization received and expires: Follow up appointments: PCP  DME needed: No DME needed   Transportation at discharge: Family Vs. Needing transportation   IM/IMM Medicare/ letter given: 2nd IMM Letter   Is patient a [place] and connected with [facility]? If yes, was [employer] transfer form completed and [facility] notified? Caregiver Contact: Pt's sonPatricia  Discharge Caregiver contacted prior to discharge? Pt's son, Vashti Bo and [facility] ([phone])  Care Conference needed? No  Barriers to discharge: Advance diet    CM spoke to [facility] via phone ([phone]) regarding pt's could potential be d/c over the weekend. According to Mr. Izquierdo they are able to accept pt back over the weekend btw the hours of 9 am-5 pm. Mr. Orlando Hospers requested for pt's information to be fax on the day of d/c ([phone] fax). CM will follow and assist with d/c planning.     Bakari Sampson

## 2023-11-03 NOTE — PROGRESS NOTES
Physician Progress Note      PATIENT:               Matt Miller  CSN #:                  [id number]  :                       1935  ADMIT DATE:       2023 10:13 AM  DISCH DATE:  RESPONDING  PROVIDER #:        Keyon Shah MD          QUERY TEXT:    Patient admitted with PHILLIP. Noted to have Severe malnutrition on    Registered Dietician progress note. Please document in progress notes and discharge summary if you are evaluating   and /or treating any of the following: The medical record reflects the following:    Risk Factors: 53-year-old female with ED pn documenting \"EMS reports patient   lives in a group home and has not been eating or drinking much lately\". Clinical Indicators:   LINA Eden RD pn: Malnutrition Assessment:  Malnutrition Status:  Severe malnutrition (23 [id number])  Context:  Chronic Illness  Findings of the 6 clinical characteristics of malnutrition:  Energy Intake:  Mild decrease in energy intake (Comment)  Weight Loss:  Greater than 10% over 6 months  Body Fat Loss:  Severe body fat loss Orbital  Muscle Mass Loss:  Severe muscle mass loss Temples (temporalis), Clavicles   (pectoralis & deltoids)  Fluid Accumulation:  No significant fluid accumulation    Anthropometric Measures:  Height: 152.4 cm (5')  Ideal Body Weight (IBW): 100 lbs (45 kg)  Current Body Weight: 49.6 kg (109 lb 5.6 oz), 109.3 % IBW. Weight Source: Bed   Scale  Current BMI (kg/m2): 21.4  Weight Adjustment For: No Adjustment  BMI Categories: Underweight (BMI less than 22) age over 72    Treatment: Registered dietician consult. RD recommendations: Diet pending   clearance from SLP    Thank you,  Kristina Gresham RN, CDI, CRCR  [email or web address]      ASPEN Criteria:    https://[email or web address] onlinelibrary. borrego. com/doi/full/10.1177/216448554349188  5  Options provided:  -- Protein calorie malnutrition severe  -- Other - I will add my own diagnosis  -- Disagree - Not applicable / Not valid  --

## 2023-11-03 NOTE — PROGRESS NOTES
Hospitalist Progress Note    NAME: Janee Urbano   : 1935   MRN: [id number]     Date/Time: 11/3/2023 11:26 AM  Patient PCP: Kevin Barreto MD    Estimated discharge date:    Anticipated Disposition / Barriers:  back to snf vs SNF    Assessment / Plan:     Hypernatremia (Na 161)  PHILLIP  Hypercalcemia  -Admit to stepdown with telemetry monitoring  -Secondary to poor oral intake and dehydration  -Status post 1 L IV fluid in the ER  -Hold losartan, spironolactone  -continue hypotonic IVFs, dec 75cc  -nephrology following. Hypercalcemia work up ordered - intact PTH, Vit D 25 in process. Failure to Thrive  Acute metabolic encephalopathy  -multifactorial from elevated Na, dehydration, hypothermia  -CT head negative  -AMS better but not safe yet for PO  -starting on clears today. Baseline on pureed  -PT OT eval.  May need SNF. Recent COVID 2 weeks ago  Patient currently on room air  Chest x-ray unremarkable  Symptomatic treatment if needed     Thrombocytopenia, mild  -likely secondary to recent COVID  -No active bleeding - monitor    History of CVA  A-fib  CHF  Bradycardia  -Continue aspirin, Eliquis, statin, amiodarone if able to tolerate   -metoprolol on hold due to bradycardia   -Valsartan, spironolactone on hold due to PHILLIP  -restart eliquis as more alert today  -follows with Dr Milla Duarte? .   Had [address] 2012- mild diffuse coronary atherosclerosis without stenosis     Hypothermia, resolved  -warming blanket prn  -TSH 0.9     Bradycardia  EKG shows widened QRS complex  Heart rate dropping to 30s and going up to 50s  TSH 0.9  Patient lethargic/not a good historian  Held metoprolol - suspect SSS  Atropine 1 mg IV x1          Medical Decision Making:   I personally reviewed labs:  CBC, BMP  I personally reviewed imaging:  Toxic drug monitoring:   Discussed case with: , lead RN during today's interdisciplinary rounds.       Code status: Full  Prophylaxis:  Eliquis    Subjective: ________________________________________________________________________  Elda Brennan MD     Procedures: see electronic medical records for all procedures/Xrays and details which were not copied into this note but were reviewed prior to creation of Plan. LABS:  I reviewed today's most current labs and imaging studies. Pertinent labs include:  Recent Labs     11/01/23  1131 11/02/23  0250   WBC 6.8 5.6   HGB 14.0 11.6   HCT 47.3* 39.6   * 133*       Recent Labs     11/01/23  1131 11/01/23  1450 11/02/23  0250 11/02/23  1815 11/03/23  0020 11/03/23  0532   *   < > 157* 152* 149* 146*   K 3.9   < > 3.8 3.3* 3.2* 3.6   *   < > 128* 119* 119* 117*   CO2 32   < > 28 28 28 28   BUN 48*   < > 41* 35* 31* 28*   PHOS  --   --  2.0*  --   --   --    ALT 56  --  46  --   --   --     < > = values in this interval not displayed.

## 2023-11-03 NOTE — PLAN OF CARE
Diagnosis Date    DJD (degenerative joint disease)     Hypercalcemia     Hypertension     Vitamin D deficiency      Past Surgical History:   Procedure Laterality Date    CARPAL TUNNEL RELEASE Right     PARTIAL HYSTERECTOMY (CERVIX NOT REMOVED)       Prior Level of Function/Home Situation:   Social/Functional History  Lives With: Other (comment) (Assisted Living)  Type of Home: Assisted living  Home Equipment: Wheelchair-manual  Active : No  Patient's  Info: Pt's family or Assisted Living    Baseline Assessment:                Cognitive and Communication Status:  Neurologic State: Alert  Orientation Level: Unable to verbalize given residual significant mixed/non-fluent aphasia s/p history of stroke  Cognition:  Decreased attention/concentration and Decreased command following    Dysphagia:  P.O. Trials:  PO Trials  Assessment Method(s): Observation  Patient Position: Upright in bed  Vocal Quality: No Impairment  Consistency Presented: Ice Chips; Thin;Pureed  How Presented: SLP-fed/Presented;Straw;Successive Swallows;Spoon  Bolus Acceptance: No impairment  Bolus Formation/Control: Impaired  Type of Impairment: Oral holding (Consistently with pureed trials)  Propulsion: absent  Oral Residue: Greater than 50% of bolus;Pocketing;Lingual  Aspiration Signs/Symptoms: Weak cough  Pharyngeal Phase Characteristics: Double swallow            Respiratory Status/Airway:  Room air                           Functional Oral Intake Scale (FOIS): 4--Total oral diet of a single consistency    After treatment:   Patient left in no apparent distress in bed, Call bell left within reach, and Nursing notified    COMMUNICATION/EDUCATION:   The patient's plan of care including recommendations, planned interventions, and recommended diet changes were discussed with: Registered nurse    Thank you,  KOREY Mack  Minutes: 13     Problem: SLP Adult - Impaired Swallowing  Goal: By Discharge: Advance to least restrictive diet without signs or symptoms of aspiration for planned discharge setting. See evaluation for individualized goals. Description: Speech Pathology Goals  Initiated 11/2/2023    1. Patient will participate in re-evaluation of swallow function within 7 days. MET 11/3/2023  Added 11/3/2023: 2. Patient will tolerate least restrictive diet without signs of aspiration or decline in respiratory status within 7 days.   Outcome: Progressing

## 2023-11-04 LAB
1,25(OH)2D3 SERPL-MCNC: 10.4 PG/ML (ref 24.8–81.5)
ANION GAP SERPL CALC-SCNC: 3 MMOL/L (ref 5–15)
BUN SERPL-MCNC: 20 MG/DL (ref 6–20)
BUN/CREAT SERPL: 22 (ref 12–20)
CALCIUM SERPL-MCNC: 10.2 MG/DL (ref 8.5–10.1)
CHLORIDE SERPL-SCNC: 109 MMOL/L (ref 97–108)
CO2 SERPL-SCNC: 28 MMOL/L (ref 21–32)
CREAT SERPL-MCNC: 0.93 MG/DL (ref 0.55–1.02)
GLUCOSE SERPL-MCNC: 101 MG/DL (ref 65–100)
MAGNESIUM SERPL-MCNC: 1.9 MG/DL (ref 1.6–2.4)
POTASSIUM SERPL-SCNC: 3.3 MMOL/L (ref 3.5–5.1)
SODIUM SERPL-SCNC: 140 MMOL/L (ref 136–145)

## 2023-11-04 PROCEDURE — 6370000000 HC RX 637 (ALT 250 FOR IP): Performed by: INTERNAL MEDICINE

## 2023-11-04 PROCEDURE — 2060000000 HC ICU INTERMEDIATE R&B

## 2023-11-04 PROCEDURE — 36415 COLL VENOUS BLD VENIPUNCTURE: CPT

## 2023-11-04 PROCEDURE — 2580000003 HC RX 258: Performed by: INTERNAL MEDICINE

## 2023-11-04 PROCEDURE — 80048 BASIC METABOLIC PNL TOTAL CA: CPT

## 2023-11-04 PROCEDURE — 83735 ASSAY OF MAGNESIUM: CPT

## 2023-11-04 RX ORDER — POTASSIUM CHLORIDE 20 MEQ/1
40 TABLET, EXTENDED RELEASE ORAL ONCE
Status: DISCONTINUED | OUTPATIENT
Start: 2023-11-04 | End: 2023-11-10 | Stop reason: HOSPADM

## 2023-11-04 RX ADMIN — AMIODARONE HYDROCHLORIDE 200 MG: 200 TABLET ORAL at 20:27

## 2023-11-04 RX ADMIN — APIXABAN 2.5 MG: 2.5 TABLET, FILM COATED ORAL at 20:27

## 2023-11-04 RX ADMIN — APIXABAN 2.5 MG: 2.5 TABLET, FILM COATED ORAL at 09:16

## 2023-11-04 RX ADMIN — Medication: at 20:28

## 2023-11-04 RX ADMIN — DEXTROSE MONOHYDRATE: 50 INJECTION, SOLUTION INTRAVENOUS at 20:12

## 2023-11-04 RX ADMIN — POTASSIUM BICARBONATE 40 MEQ: 782 TABLET, EFFERVESCENT ORAL at 14:13

## 2023-11-04 RX ADMIN — Medication: at 14:15

## 2023-11-04 RX ADMIN — SODIUM CHLORIDE, PRESERVATIVE FREE 10 ML: 5 INJECTION INTRAVENOUS at 20:28

## 2023-11-04 NOTE — PLAN OF CARE
Problem: Safety - Adult  Goal: Free from fall injury  Outcome: Progressing     Problem: SLP Adult - Impaired Swallowing  Goal: By Discharge: Advance to least restrictive diet without signs or symptoms of aspiration for planned discharge setting. See evaluation for individualized goals. Description: Speech Pathology Goals  Initiated 11/2/2023    1. Patient will participate in re-evaluation of swallow function within 7 days. MET 11/3/2023  Added 11/3/2023: 2. Patient will tolerate least restrictive diet without signs of aspiration or decline in respiratory status within 7 days.   11/3/2023 1101 by KOREY Wilks  Outcome: Progressing

## 2023-11-04 NOTE — PROGRESS NOTES
Hospitalist Progress Note    NAME: Lauren Wright   : 1935   MRN: [id number]     Date/Time: 2023 11:07 AM  Patient PCP: Shefali Chavarria MD    Estimated discharge date:    Anticipated Disposition / Barriers:  back to [facility] vs SNF    Assessment / Plan:     Hypernatremia (Na 161)  PHILLIP, resolved  Hypercalcemia, improved  -Admit to stepdown with telemetry monitoring  -Secondary to poor oral intake and dehydration  -Status post 1 L IV fluid in the ER  -Hold losartan, spironolactone  -continue hypotonic IVFs, dec 50. Na 140  - intact , Vit D 25 10. Seems consistent with primary hyperparathyroidism     Failure to Thrive  Acute metabolic encephalopathy  -multifactorial from elevated Na, dehydration, hypothermia  -CT head negative  -AMS better, tolerating CLD, will advance to pureed (reportedly baseline)  -PT OT eval.  May need SNF. Recent COVID 2 weeks ago  Patient currently on room air  Chest x-ray unremarkable  Symptomatic treatment if needed  Not hypoxic. %     Thrombocytopenia, mild  -likely secondary to recent COVID  -No active bleeding - monitor    History of CVA  A-fib  CHF  Bradycardia  -Continue aspirin, Eliquis, statin, amiodarone if able to tolerate   -metoprolol on hold due to bradycardia   -Valsartan, spironolactone on hold due to PHILLIP  -restart eliquis as more alert today  -follows with Dr Lisa Porter? .   Had [address] 2012- mild diffuse coronary atherosclerosis without stenosis     Hypothermia, resolved  -warming blanket prn  -TSH 0.9     Bradycardia  EKG shows widened QRS complex  Heart rate dropping to 30s and going up to 50s  TSH 0.9  Patient lethargic/not a good historian  Held metoprolol - suspect SSS  Atropine 1 mg IV x1          Medical Decision Making:   I personally reviewed labs:  CBC, BMP  I personally reviewed imaging:  Toxic drug monitoring:   Discussed case with: , lead RN during today's interdisciplinary rounds.       Code status: Full  Prophylaxis:

## 2023-11-05 LAB
ALBUMIN SERPL-MCNC: 1.5 G/DL (ref 3.5–5)
ALBUMIN/GLOB SERPL: 0.4 (ref 1.1–2.2)
ALP SERPL-CCNC: 101 U/L (ref 45–117)
ALT SERPL-CCNC: 58 U/L (ref 12–78)
ANION GAP SERPL CALC-SCNC: 7 MMOL/L (ref 5–15)
AST SERPL-CCNC: 63 U/L (ref 15–37)
BILIRUB SERPL-MCNC: 0.9 MG/DL (ref 0.2–1)
BUN SERPL-MCNC: 20 MG/DL (ref 6–20)
BUN/CREAT SERPL: 20 (ref 12–20)
CALCIUM SERPL-MCNC: 9.8 MG/DL (ref 8.5–10.1)
CALCIUM SERPL-MCNC: 9.8 MG/DL (ref 8.5–10.1)
CHLORIDE SERPL-SCNC: 110 MMOL/L (ref 97–108)
CO2 SERPL-SCNC: 20 MMOL/L (ref 21–32)
COMMENT:: NORMAL
CREAT SERPL-MCNC: 1 MG/DL (ref 0.55–1.02)
GLOBULIN SER CALC-MCNC: 4.1 G/DL (ref 2–4)
GLUCOSE SERPL-MCNC: 86 MG/DL (ref 65–100)
MAGNESIUM SERPL-MCNC: 1.8 MG/DL (ref 1.6–2.4)
POTASSIUM SERPL-SCNC: 3.5 MMOL/L (ref 3.5–5.1)
PROT SERPL-MCNC: 5.6 G/DL (ref 6.4–8.2)
PTH-INTACT SERPL-MCNC: 242.2 PG/ML (ref 18.4–88)
SODIUM SERPL-SCNC: 137 MMOL/L (ref 136–145)
SPECIMEN HOLD: NORMAL

## 2023-11-05 PROCEDURE — 36415 COLL VENOUS BLD VENIPUNCTURE: CPT

## 2023-11-05 PROCEDURE — 6370000000 HC RX 637 (ALT 250 FOR IP): Performed by: INTERNAL MEDICINE

## 2023-11-05 PROCEDURE — 2060000000 HC ICU INTERMEDIATE R&B

## 2023-11-05 PROCEDURE — 2580000003 HC RX 258: Performed by: INTERNAL MEDICINE

## 2023-11-05 PROCEDURE — 80053 COMPREHEN METABOLIC PANEL: CPT

## 2023-11-05 PROCEDURE — 83735 ASSAY OF MAGNESIUM: CPT

## 2023-11-05 PROCEDURE — 83970 ASSAY OF PARATHORMONE: CPT

## 2023-11-05 RX ADMIN — AMIODARONE HYDROCHLORIDE 200 MG: 200 TABLET ORAL at 10:53

## 2023-11-05 RX ADMIN — APIXABAN 2.5 MG: 2.5 TABLET, FILM COATED ORAL at 20:52

## 2023-11-05 RX ADMIN — SODIUM CHLORIDE, PRESERVATIVE FREE 10 ML: 5 INJECTION INTRAVENOUS at 20:53

## 2023-11-05 RX ADMIN — Medication: at 20:53

## 2023-11-05 RX ADMIN — POTASSIUM BICARBONATE 40 MEQ: 782 TABLET, EFFERVESCENT ORAL at 10:52

## 2023-11-05 RX ADMIN — APIXABAN 2.5 MG: 2.5 TABLET, FILM COATED ORAL at 10:53

## 2023-11-05 RX ADMIN — Medication: at 10:54

## 2023-11-05 RX ADMIN — SODIUM CHLORIDE, PRESERVATIVE FREE 10 ML: 5 INJECTION INTRAVENOUS at 10:53

## 2023-11-05 NOTE — PROGRESS NOTES
[address] sent per order. 1456 - Patient bradying down to 45s and 46s. Informed Dr. Evon Bansal that BID amio may need to be held or adjusted. 80 - Cardiology consult called to Dr. Christina Baer. 1900 - Bedside shift change report given to [address] [place] [address],[address] (oncoming nurse) by Kaleb Santos (offgoing nurse). Report included the following information Nurse Handoff Report, Index, Adult Overview, Intake/Output, MAR, Recent Results, Med Rec Status, and Cardiac Rhythm sinus anton BBB .

## 2023-11-05 NOTE — PROGRESS NOTES
NAME: Amelia Musa        :  1935        MRN:  [id number]                     Assessment   :                                               Plan:  Hypernatremia, recurrent  PHILLIP  Hypercalcemia  Mild thrombocytopenia  Soft BP and bradycardia at times  Hypothermia  Recent covid Na peak 161, now 152 to 146 to 140 to 137    Stop IV D5W  Push water intake as tolerated     Creatinine <1mg/dl today     cCa 12.9 to 11.8 to 10.8 today; phos is low; agree with IVF's, iPTH inappropriately elevated in the setting of hypercalcemia-> repeat level for accuracy. 25 vd, 1,25 vd both low; immobility contributing  some     Agree with holding ARB/aldactone - I would stay off these on discharge    K low-> supplement oral Effer-K    Am labs       Subjective:     Chief Complaint:  alert. Non-verbal.  Discussed case with RN    Review of Systems:    Symptom Y/N Comments  Symptom Y/N Comments   Fever/Chills    Chest Pain     Poor Appetite    Edema     Cough    Abdominal Pain     Sputum    Joint Pain     SOB/JOYCE    Pruritis/Rash     Nausea/vomit    Tolerating PT/OT     Diarrhea    Tolerating Diet     Constipation    Other       Could not obtain due to:      Objective:     VITALS:   Last 24hrs VS reviewed since prior progress note.  Most recent are:  Vitals:    23 0345   BP: (!) 108/58   Pulse:    Resp:    Temp:    SpO2:        Intake/Output Summary (Last 24 hours) at 2023 0940  Last data filed at 2023 1113  Gross per 24 hour   Intake --   Output 400 ml   Net -400 ml        Telemetry Reviewed:     PHYSICAL EXAM:  General: NAD  No edema    Lab Data Reviewed: (see below)    Medications Reviewed: (see below)    PMH/SH reviewed - no change compared to H&P  ________________________________________________________________________  Care Plan discussed with:  Patient     Family      RN Y    Care Manager                    Consultant:          Comments

## 2023-11-05 NOTE — PROGRESS NOTES
Hospitalist Progress Note    NAME: Florence Collins   : 1935   MRN: [id number]     Date/Time: 2023 11:00 AM  Patient PCP: Morales Ortiz MD    Estimated discharge date:    Anticipated Disposition / Barriers:  back to [facility] vs SNF when eating better. Cards to eval bradycardia    Assessment / Plan:     Hypernatremia (Na 161), resolved  PHILLIP, resolved  Dehydration, resolved  -Admit to stepdown with telemetry monitoring  -Secondary to poor oral intake and dehydration  -Status post 1 L IV fluid in the ER  -Hold losartan, spironolactone - renal recommends not to restart  -Na 137. Stopping IVFs. Repeat labs in AM    Hypercalcemia, resolved, likely from primary hyperparathyroidism  - intact  (high), Vit D 25 (10, low). Seems consistent with primary hyperparathyroidism. Renal repeating iPTH level - if still elevated, will need follow up or referral with endocrine or PCP  -appreciate Renal help     Failure to Thrive  Acute metabolic encephalopathy  -multifactorial from elevated Na, dehydration, hypothermia  -CT head negative  -AMS better, advanced to pureed (reportedly baseline) but nursing reports not eating much today. At risk for dehydration. If she does not improve despite correction of her electrolyte abnormalities, can consider remeron and palliative care consult   -PT OT eval.  May need SNF. Recent COVID 2 weeks ago  Patient currently on room air  Chest x-ray unremarkable  Symptomatic treatment if needed  Not hypoxic. %     Thrombocytopenia, mild  -likely secondary to recent COVID  -No active bleeding - monitor    History of CVA  A-fib  CHF  -Continue aspirin, Eliquis, statin, if able to tolerate   -metoprolol on hold due to bradycardia. -Valsartan, spironolactone on hold due to PHILLIP  -restart eliquis as more alert today  -follows with Dr Lex Hernandez? .   Had [address] 2012- mild diffuse coronary atherosclerosis without stenosis     Bradycardia, 40s, ?  SSS  EKG shows widened QRS today with , nursing, pharmacist and clinical coordinator. Patient's plan of care was discussed; medications were reviewed and discharge planning was addressed. ________________________________________________________________________  Total NON critical care TIME:   35  Minutes    Total CRITICAL CARE TIME Spent:   Minutes non procedure based      Comments   >50% of visit spent in counseling and coordination of care x     This includes time during multidisciplinary rounds if indicated above   ________________________________________________________________________  Ajith Mcnulty MD     Procedures: see electronic medical records for all procedures/Xrays and details which were not copied into this note but were reviewed prior to creation of Plan. LABS:  I reviewed today's most current labs and imaging studies. Pertinent labs include:  No results for input(s): \"WBC\", \"HGB\", \"HCT\", \"PLT\" in the last 72 hours.     Recent Labs     11/03/23  0532 11/04/23  0311 11/05/23  0236   * 140 137   K 3.6 3.3* 3.5   * 109* 110*   CO2 28 28 20*   BUN 28* 20 20   MG  --  1.9 1.8   ALT  --   --  58

## 2023-11-06 PROBLEM — Z71.89 GOALS OF CARE, COUNSELING/DISCUSSION: Status: ACTIVE | Noted: 2023-11-06

## 2023-11-06 PROBLEM — R54 FRAILTY: Status: ACTIVE | Noted: 2023-11-06

## 2023-11-06 PROBLEM — E43 SEVERE MALNUTRITION (HCC): Status: ACTIVE | Noted: 2023-11-06

## 2023-11-06 PROBLEM — E86.0 DEHYDRATION: Status: ACTIVE | Noted: 2023-11-06

## 2023-11-06 LAB
ALBUMIN SERPL-MCNC: 2 G/DL (ref 3.5–5)
ALBUMIN/GLOB SERPL: 0.5 (ref 1.1–2.2)
ALP SERPL-CCNC: 104 U/L (ref 45–117)
ALT SERPL-CCNC: 60 U/L (ref 12–78)
ANION GAP SERPL CALC-SCNC: 3 MMOL/L (ref 5–15)
AST SERPL-CCNC: 62 U/L (ref 15–37)
BILIRUB SERPL-MCNC: 1.1 MG/DL (ref 0.2–1)
BUN SERPL-MCNC: 21 MG/DL (ref 6–20)
BUN/CREAT SERPL: 20 (ref 12–20)
CALCIUM SERPL-MCNC: 10.2 MG/DL (ref 8.5–10.1)
CHLORIDE SERPL-SCNC: 109 MMOL/L (ref 97–108)
CO2 SERPL-SCNC: 29 MMOL/L (ref 21–32)
CREAT SERPL-MCNC: 1.03 MG/DL (ref 0.55–1.02)
ERYTHROCYTE [DISTWIDTH] IN BLOOD BY AUTOMATED COUNT: 15.1 % (ref 11.5–14.5)
GLOBULIN SER CALC-MCNC: 4.2 G/DL (ref 2–4)
GLUCOSE SERPL-MCNC: 75 MG/DL (ref 65–100)
HCT VFR BLD AUTO: 42.5 % (ref 35–47)
HGB BLD-MCNC: 13.4 G/DL (ref 11.5–16)
MAGNESIUM SERPL-MCNC: 2 MG/DL (ref 1.6–2.4)
MCH RBC QN AUTO: 30.6 PG (ref 26–34)
MCHC RBC AUTO-ENTMCNC: 31.5 G/DL (ref 30–36.5)
MCV RBC AUTO: 97 FL (ref 80–99)
NRBC # BLD: 0 K/UL (ref 0–0.01)
NRBC BLD-RTO: 0 PER 100 WBC
PHOSPHATE SERPL-MCNC: 2.4 MG/DL (ref 2.6–4.7)
PLATELET # BLD AUTO: 131 K/UL (ref 150–400)
PMV BLD AUTO: 11.2 FL (ref 8.9–12.9)
POTASSIUM SERPL-SCNC: 3.5 MMOL/L (ref 3.5–5.1)
PROT SERPL-MCNC: 6.2 G/DL (ref 6.4–8.2)
RBC # BLD AUTO: 4.38 M/UL (ref 3.8–5.2)
SODIUM SERPL-SCNC: 141 MMOL/L (ref 136–145)
WBC # BLD AUTO: 5.3 K/UL (ref 3.6–11)

## 2023-11-06 PROCEDURE — 6360000002 HC RX W HCPCS: Performed by: NURSE PRACTITIONER

## 2023-11-06 PROCEDURE — 93005 ELECTROCARDIOGRAM TRACING: CPT | Performed by: INTERNAL MEDICINE

## 2023-11-06 PROCEDURE — 83735 ASSAY OF MAGNESIUM: CPT

## 2023-11-06 PROCEDURE — 2580000003 HC RX 258: Performed by: INTERNAL MEDICINE

## 2023-11-06 PROCEDURE — 99222 1ST HOSP IP/OBS MODERATE 55: CPT | Performed by: NURSE PRACTITIONER

## 2023-11-06 PROCEDURE — P9047 ALBUMIN (HUMAN), 25%, 50ML: HCPCS | Performed by: NURSE PRACTITIONER

## 2023-11-06 PROCEDURE — 92526 ORAL FUNCTION THERAPY: CPT | Performed by: SPEECH-LANGUAGE PATHOLOGIST

## 2023-11-06 PROCEDURE — 84100 ASSAY OF PHOSPHORUS: CPT

## 2023-11-06 PROCEDURE — 36415 COLL VENOUS BLD VENIPUNCTURE: CPT

## 2023-11-06 PROCEDURE — 85027 COMPLETE CBC AUTOMATED: CPT

## 2023-11-06 PROCEDURE — 6370000000 HC RX 637 (ALT 250 FOR IP): Performed by: INTERNAL MEDICINE

## 2023-11-06 PROCEDURE — 80053 COMPREHEN METABOLIC PANEL: CPT

## 2023-11-06 PROCEDURE — 2060000000 HC ICU INTERMEDIATE R&B

## 2023-11-06 RX ORDER — ALBUMIN (HUMAN) 12.5 G/50ML
25 SOLUTION INTRAVENOUS ONCE
Status: COMPLETED | OUTPATIENT
Start: 2023-11-06 | End: 2023-11-06

## 2023-11-06 RX ADMIN — ALBUMIN (HUMAN) 25 G: 0.25 INJECTION, SOLUTION INTRAVENOUS at 03:17

## 2023-11-06 RX ADMIN — Medication: at 20:27

## 2023-11-06 RX ADMIN — Medication: at 07:58

## 2023-11-06 RX ADMIN — APIXABAN 2.5 MG: 2.5 TABLET, FILM COATED ORAL at 20:26

## 2023-11-06 RX ADMIN — SODIUM CHLORIDE, PRESERVATIVE FREE 10 ML: 5 INJECTION INTRAVENOUS at 07:58

## 2023-11-06 RX ADMIN — SODIUM CHLORIDE, PRESERVATIVE FREE 10 ML: 5 INJECTION INTRAVENOUS at 20:27

## 2023-11-06 RX ADMIN — APIXABAN 2.5 MG: 2.5 TABLET, FILM COATED ORAL at 07:58

## 2023-11-06 NOTE — PLAN OF CARE
Speech LAnguage Pathology TREATMENT    Patient: Kathryn Ruano (86 y.o. female)  Date: 11/6/2023  Primary Diagnosis: Hypernatremia [E87.0]       Precautions:                     ASSESSMENT :  Patient alert in bed. Patient nodded when shown PO trials. Patient tolerated single and successive sips of thin liquids without difficulty and free of s/s aspiration. Patient accepted multiple trials of puree. Bolus manipulation and oral transit prolonged. Minimal to no oral residue. Liquid wash aided in clearing minimal oral residue. Patient more readily accepted liquids than purees. Given bedside presentation feel patient is safe to continue pureed diet, thin liquids with precautions listed below. Patient will benefit from skilled intervention to address the above impairments. PLAN :  Recommendations and Planned Interventions:  Diet: Puree and thin liquids  Must be awake/alert and actively accepting  Sit up for all meals  Alternate solid and liquids     Acute SLP Services: Yes, SLP will continue to follow per plan of care. Discharge Recommendations: Continue to assess pending progress     SUBJECTIVE:   Patient nodded. Diet advanced to puree over weekend. OBJECTIVE:     Past Medical History:   Diagnosis Date    DJD (degenerative joint disease)     Hypercalcemia     Hypertension     Vitamin D deficiency      Past Surgical History:   Procedure Laterality Date    CARPAL TUNNEL RELEASE Right     PARTIAL HYSTERECTOMY (CERVIX NOT REMOVED)       Prior Level of Function/Home Situation:   Social/Functional History  Lives With: Other (comment) (Assisted Living)  Type of Home: Assisted living  Home Equipment: Wheelchair-manual  Active : No  Patient's  Info: Pt's family or Assisted Living      Cognitive and Communication Status:  Neurologic State: Alert    Dysphagia:  Oral Assessment:     P.O.  Trials:  PO Trials  Assessment Method(s): Observation  Patient Position: Upright in bed  Consistency Presented: Pureed; Thin  How Presented: SLP-fed/Presented;Spoon;Straw  Bolus Acceptance: No impairment  Bolus Formation/Control: Impaired  Type of Impairment: Delayed  Propulsion: Delayed (# of seconds)  Oral Residue: None  Aspiration Signs/Symptoms: None       Respiratory Status/Airway:  Room air             After treatment:   Patient left in no apparent distress in bed and Call bell left within reach    COMMUNICATION/EDUCATION:   Patient was educated regarding role of SLP. The patient's plan of care including recommendations, planned interventions, and recommended diet changes were discussed with:     Patient/family have participated as able in goal setting and plan of care    Thank you,  Luis Manuel Gonzales, SLP  Minutes: 15   Problem: SLP Adult - Impaired Swallowing  Goal: By Discharge: Advance to least restrictive diet without signs or symptoms of aspiration for planned discharge setting. See evaluation for individualized goals. Description: Speech Pathology Goals  Initiated 11/2/2023    1. Patient will participate in re-evaluation of swallow function within 7 days. MET 11/3/2023  Added 11/3/2023: 2. Patient will tolerate least restrictive diet without signs of aspiration or decline in respiratory status within 7 days.   Outcome: Progressing

## 2023-11-06 NOTE — PROGRESS NOTES
Physician Progress Note      PATIENT:               Estrella Knox  CSN #:                  [id number]  :                       1935  ADMIT DATE:       2023 10:13 AM  DISCH DATE:  RESPONDING  PROVIDER #:        Kush Hyatt MD          QUERY TEXT:    Pt admitted with PHILLIP and hypernatremia and has CHF documented. Please   document in progress notes and discharge summary further specificity regarding   the type and acuity of CHF:      The medical record reflects the following:  Risk Factors: h/o CHF with Afib  Clinical Indicators: historical data: Last ECHO noted on 23 with   estimated EF of 30-35%  Treatment: Valsartan, spironolactone on hold due to PHILLIP (Dr Allen Chao  pn)    Thank you,  Cyndie Richardson RN, CRISTINA, [email or web address]  Options provided:  -- Chronic Systolic CHF/HFrEF  -- Other - I will add my own diagnosis  -- Disagree - Not applicable / Not valid  -- Disagree - Clinically unable to determine / Unknown  -- Refer to Clinical Documentation Reviewer    PROVIDER RESPONSE TEXT:    This patient has chronic systolic CHF/HFrEF. Query created by: Cyndie Richardson on 2023 8:10 AM      QUERY TEXT:    Patient admitted with PHILLIP and hypernatremia. Patient noted to have atrial fibrillation and is maintained on Eliquis. Please   document in progress notes and discharge summary if you are evaluating and/or   treating any of the following:     The medical record reflects the following:  Risk Factors: 29-year-old female with CHF and h/o CVA  Clinical Indicators: Atrial Fibrillation  Treatment: Eliquis 2.5 mg po bid      Thank you,  Cyndie Richardson RN, CRISTINA, [email or web address]  Options provided:  -- Secondary hypercoagulable state in a patient with atrial fibrillation  -- Other - I will add my own diagnosis  -- Disagree - Not applicable / Not valid  -- Disagree - Clinically unable to determine / Unknown  -- Refer to Clinical Documentation Reviewer    PROVIDER RESPONSE TEXT:    This patient has secondary hypercoagulable state in a patient with atrial   fibrillation.     Query created by: Hyun Luo on 11/6/2023 8:15 AM      Electronically signed by:  Lex Mcdaniel MD 11/6/2023 12:25 PM

## 2023-11-06 NOTE — PROGRESS NOTES
Hospitalist Progress Note    NAME: Isabelle Preston   : 1935   MRN: [id number]     Date/Time: 2023 12:39 PM  Patient PCP: Yumiko Tipton MD    Estimated discharge date:    Anticipated Disposition / Barriers:  back to [facility] vs SNF when eating better. Cards to eval bradycardia    Assessment / Plan:     Hypernatremia (Na 161), resolved  PHILLIP, resolved  Dehydration, resolved  -Admit to stepdown with telemetry monitoring  -Secondary to poor oral intake and dehydration  -Status post 1 L IV fluid in the ER  -Hold losartan, spironolactone - renal recommends not to restart  -Na and Cr has normalized but patient high risk for readmission due to poor intake    Hypercalcemia, resolved, from primary hyperparathyroidism  -intact  (high), Vit D 25 (10, low). -repeat iPTH 242 (high). This is consistent with primary hyperparathyroidism.   -if her intake improves and she clinically remains stable, can follow up with endocrine or PCP as outpatient  -appreciate Renal help     Failure to Thrive / Poor intake  Acute metabolic encephalopathy, improved  -multifactorial from elevated Na, dehydration, hypothermia  -CT head negative  -AMS better but does not want to eat. Tried to advance to pureed (reportedly baseline) but nursing reports did not eat yesterday and does not want to eat this AM.    -will consult Palliative care  -PT OT natlaya.     Recent COVID 2 weeks ago  Patient currently on room air  Chest x-ray unremarkable  Symptomatic treatment if needed  Not hypoxic. %     Thrombocytopenia, mild  -likely secondary to recent COVID  -No active bleeding - monitor    History of CVA  A-fib  CHF  -Continue aspirin, Eliquis, statin, if able to tolerate   -metoprolol on hold due to bradycardia. -Valsartan, spironolactone on hold due to PHILLIP  -restart eliquis as more alert today  -follows with Dr Jess Cervantes? .   Had [address] 2012- mild diffuse coronary atherosclerosis without stenosis     Bradycardia, 40s, ?

## 2023-11-06 NOTE — CARE COORDINATION
Transition of Care Plan:     RUR: 18%  Prior Level of Functioning: Assistance  Disposition: [facility]  5:47 PM   CM completed chart review. Noted Palliative planning a family meeting tomorrow. CM will look for outcome of goals of care meeting. If SNF or IPR: Date FOC offered:   Date FOC received:   Accepting facility:   Date authorization started with reference number:   Date authorization received and expires: Follow up appointments: PCP  DME needed: No DME needed   Transportation at discharge: Family Vs. Needing transportation   IM/IMM Medicare/ letter given: 2nd IMM Letter   Is patient a  and connected with [facility]? If yes, was [employer] transfer form completed and [facility] notified? Caregiver Contact: Pt's son, Calvin Joyner  Discharge Caregiver contacted prior to discharge? Pt's son, Radha Ruiz and [facility] ([phone])  Care Conference needed? No  Barriers to discharge: Advance diet    CM will continue to monitor discharge plan.      Luther Galo, [facility]  Ext [phone]

## 2023-11-06 NOTE — CONSULTS
EP/ ARRHYTHMIA/ CARDIOLOGY CONSULT    Patient ID:  Patient: Kathryn Ruano  MRN: [id number]  Age: 82 y.o.  : 1935    Date of  Admission: 2023 10:13 AM   PCP:  Monique Gorman MD    Assessment:   Sinus node dysfunction. 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 Paroxysmal atrial fibrillation, started on amiodarone 2023. Cardiomyopathy EF 30-35% by echo 2023, likely nonischemic. Hypertension. Acute renal failure from dehydration now resolved, chronic kidney disease stage 3-4. Severe malnutrition and anorexia. 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 Hyperparathyroidism with elevated Ca. Full code. Plan:     Given her reduced functional status and higher risk for complication, I would avoid offering a dual chamber pacemaker at this time. Will tolerate resting HR's in the 40-60's range and even transiently going down to the 30's. Continue to stay off metoprolol. This is out of her system. OK to restart amiodarone at 100 mg daily later this week. Over time (weeks), the lower maintenance dose will translate into slightly higher HR's. Continue anticoagulation as able. I'll check back later in the week. Palliative care involvement noted. [x]       High complexity decision making was performed in this patient at high risk for decompensation with multiple organ involvement. Kathryn Ruano is a 82 y.o. female with a history of debilitating stroke earlier this year, here with dehydration, metabolic disarray, and renal failure. This has been addressed medically. I was asked to see her due to bradycardia. The interview is very limited due to aphasia. She nods her head yes to a lot. Her beta-blocker was stopped on admission. Her amiodarone was held recently.     Past Medical History:   Diagnosis Date    DJD (degenerative joint disease)     Hypercalcemia     Hypertension     Vitamin D

## 2023-11-06 NOTE — PLAN OF CARE
Problem: Discharge Planning  Goal: Discharge to home or other facility with appropriate resources  11/5/2023 1958 by Jessica Mcfarland RN  Outcome: Progressing  11/5/2023 1232 by Saintclair Kennedy, RN  Outcome: Progressing     Problem: Pain  Goal: Verbalizes/displays adequate comfort level or baseline comfort level  11/5/2023 1958 by Jessica Mcfarland RN  Outcome: Progressing  11/5/2023 1232 by Saintclair Kennedy, RN  Outcome: Progressing     Problem: Risk for Elopement  Goal: Patient will not exit the unit/facility without proper excort  11/5/2023 1232 by Saintclair Kennedy, RN  Outcome: Progressing     Problem: Skin/Tissue Integrity  Goal: Absence of new skin breakdown  Description: 1. Monitor for areas of redness and/or skin breakdown  2. Assess vascular access sites hourly  3. Every 4-6 hours minimum:  Change oxygen saturation probe site  4. Every 4-6 hours:  If on nasal continuous positive airway pressure, respiratory therapy assess nares and determine need for appliance change or resting period.   11/5/2023 1232 by Saintclair Kennedy, RN  Outcome: Progressing     Problem: Safety - Adult  Goal: Free from fall injury  11/5/2023 1232 by Saintclair Kennedy, RN  Outcome: Progressing

## 2023-11-06 NOTE — PROGRESS NOTES
Noted cardiology consult order. Noted prior contact with [facility]. Please call [facility].

## 2023-11-06 NOTE — PROGRESS NOTES
Comprehensive Nutrition Assessment    Type and Reason for Visit:  Reassess    Nutrition Recommendations/Plan:   Continue purees and thins (per SLP)  Added Ensure enlive/plus BID and Magic cup daily  Please document % meals and supplements consumed in flowsheet I/O's under intake      Malnutrition Assessment:  Malnutrition Status:  Severe malnutrition (11/02/23 1459)    Context:  Chronic Illness     Findings of the 6 clinical characteristics of malnutrition:  Energy Intake:  Mild decrease in energy intake (Comment)  Weight Loss:  Greater than 10% over 6 months     Body Fat Loss:  Severe body fat loss Orbital   Muscle Mass Loss:  Severe muscle mass loss Temples (temporalis), Clavicles (pectoralis & deltoids)  Fluid Accumulation:  No significant fluid accumulation     Strength:  Not Performed    Nutrition Assessment:     Chart reviewed and case discussed during IDR. Pt cleared for purees and thins per SLP, but PO intake has been extremely poor. SLP noted pt more readily accepted liquids than purees. Discussed with RN, all he could get her to eat was some ice cream this morning. She does not say much when asked about getting her anything to eat, sometimes says \"I don't know\" or gives a head nod vs grimace. Despite offering foods, she is not eating much at all and cannot say why. MD consulting palliative to address goals of care. Pt had clearly soiled herself in urine, and when asked if she was wet she said no. Unsure if this is her baseline mental status. Patient Vitals for the past 120 hrs:   PO Meals Eaten (%)   11/04/23 1610 26 - 50%   11/04/23 1427 51 - 75%   11/03/23 1841 26 - 50%     Wt Readings from Last 5 Encounters:   11/01/23 49.6 kg (109 lb 5.6 oz)   10/07/23 59 kg (130 lb)   07/04/23 59 kg (130 lb)   06/26/23 60.8 kg (134 lb)   04/14/21 69.9 kg (154 lb)   ]    Nutrition Related Findings:    Labs: Cr 1.03, phos 2.4. Meds: reviewed. Edema: 1+ BUE. BM 11/5.    Wound Type: Unstageable, Deep Tissue Annie Franco, [address] [facility]  Contact:

## 2023-11-07 LAB
ALBUMIN SERPL-MCNC: 2.3 G/DL (ref 3.5–5)
ALBUMIN/GLOB SERPL: 0.7 (ref 1.1–2.2)
ALP SERPL-CCNC: 116 U/L (ref 45–117)
ALT SERPL-CCNC: 73 U/L (ref 12–78)
ANION GAP SERPL CALC-SCNC: 4 MMOL/L (ref 5–15)
AST SERPL-CCNC: 72 U/L (ref 15–37)
BILIRUB SERPL-MCNC: 1 MG/DL (ref 0.2–1)
BUN SERPL-MCNC: 19 MG/DL (ref 6–20)
BUN/CREAT SERPL: 19 (ref 12–20)
CALCIUM SERPL-MCNC: 10.2 MG/DL (ref 8.5–10.1)
CHLORIDE SERPL-SCNC: 108 MMOL/L (ref 97–108)
CO2 SERPL-SCNC: 28 MMOL/L (ref 21–32)
CREAT SERPL-MCNC: 1.02 MG/DL (ref 0.55–1.02)
EKG ATRIAL RATE: 52 BPM
EKG DIAGNOSIS: NORMAL
EKG P AXIS: 57 DEGREES
EKG P-R INTERVAL: 208 MS
EKG Q-T INTERVAL: 600 MS
EKG QRS DURATION: 164 MS
EKG QTC CALCULATION (BAZETT): 558 MS
EKG R AXIS: -53 DEGREES
EKG T AXIS: 0 DEGREES
EKG VENTRICULAR RATE: 52 BPM
ERYTHROCYTE [DISTWIDTH] IN BLOOD BY AUTOMATED COUNT: 15 % (ref 11.5–14.5)
GLOBULIN SER CALC-MCNC: 3.5 G/DL (ref 2–4)
GLUCOSE SERPL-MCNC: 91 MG/DL (ref 65–100)
HCT VFR BLD AUTO: 37.4 % (ref 35–47)
HGB BLD-MCNC: 11.7 G/DL (ref 11.5–16)
MAGNESIUM SERPL-MCNC: 1.9 MG/DL (ref 1.6–2.4)
MCH RBC QN AUTO: 29.4 PG (ref 26–34)
MCHC RBC AUTO-ENTMCNC: 31.3 G/DL (ref 30–36.5)
MCV RBC AUTO: 94 FL (ref 80–99)
NRBC # BLD: 0 K/UL (ref 0–0.01)
NRBC BLD-RTO: 0 PER 100 WBC
PHOSPHATE SERPL-MCNC: 2 MG/DL (ref 2.6–4.7)
PLATELET # BLD AUTO: 129 K/UL (ref 150–400)
PMV BLD AUTO: 11.9 FL (ref 8.9–12.9)
POTASSIUM SERPL-SCNC: 3.8 MMOL/L (ref 3.5–5.1)
PROT SERPL-MCNC: 5.8 G/DL (ref 6.4–8.2)
RBC # BLD AUTO: 3.98 M/UL (ref 3.8–5.2)
SODIUM SERPL-SCNC: 140 MMOL/L (ref 136–145)
WBC # BLD AUTO: 4.7 K/UL (ref 3.6–11)

## 2023-11-07 PROCEDURE — 99233 SBSQ HOSP IP/OBS HIGH 50: CPT | Performed by: NURSE PRACTITIONER

## 2023-11-07 PROCEDURE — 6370000000 HC RX 637 (ALT 250 FOR IP): Performed by: INTERNAL MEDICINE

## 2023-11-07 PROCEDURE — 2580000003 HC RX 258: Performed by: INTERNAL MEDICINE

## 2023-11-07 PROCEDURE — 84100 ASSAY OF PHOSPHORUS: CPT

## 2023-11-07 PROCEDURE — 83735 ASSAY OF MAGNESIUM: CPT

## 2023-11-07 PROCEDURE — 36415 COLL VENOUS BLD VENIPUNCTURE: CPT

## 2023-11-07 PROCEDURE — 85027 COMPLETE CBC AUTOMATED: CPT

## 2023-11-07 PROCEDURE — 92526 ORAL FUNCTION THERAPY: CPT

## 2023-11-07 PROCEDURE — 80053 COMPREHEN METABOLIC PANEL: CPT

## 2023-11-07 PROCEDURE — 2060000000 HC ICU INTERMEDIATE R&B

## 2023-11-07 RX ADMIN — SODIUM CHLORIDE, PRESERVATIVE FREE 10 ML: 5 INJECTION INTRAVENOUS at 20:38

## 2023-11-07 RX ADMIN — APIXABAN 2.5 MG: 2.5 TABLET, FILM COATED ORAL at 07:46

## 2023-11-07 RX ADMIN — Medication: at 07:46

## 2023-11-07 RX ADMIN — SODIUM CHLORIDE, PRESERVATIVE FREE 10 ML: 5 INJECTION INTRAVENOUS at 07:47

## 2023-11-07 RX ADMIN — APIXABAN 2.5 MG: 2.5 TABLET, FILM COATED ORAL at 20:37

## 2023-11-07 RX ADMIN — Medication: at 20:37

## 2023-11-07 NOTE — PLAN OF CARE
Problem: Discharge Planning  Goal: Discharge to home or other facility with appropriate resources  Outcome: Progressing     Problem: Pain  Goal: Verbalizes/displays adequate comfort level or baseline comfort level  Outcome: Progressing     Problem: SLP Adult - Impaired Swallowing  Goal: By Discharge: Advance to least restrictive diet without signs or symptoms of aspiration for planned discharge setting. See evaluation for individualized goals. Description: Speech Pathology Goals  Initiated 11/2/2023    1. Patient will participate in re-evaluation of swallow function within 7 days. MET 11/3/2023  Added 11/3/2023: 2. Patient will tolerate least restrictive diet without signs of aspiration or decline in respiratory status within 7 days.   11/6/2023 1337 by KOREY Fields  Outcome: Progressing

## 2023-11-07 NOTE — PROGRESS NOTES
Spiritual Care Assessment/Progress Note  Romie    Name: Candice Marin MRN: [id number]    Age: 82 y.o. Sex: female   Language: English     Date: 11/7/2023            Total Time Calculated: 14 min              Spiritual Assessment begun in [facility] 2 CARDIOPULMONARY CARE  Service Provided For[de-identified] Patient  Referral/Consult From[de-identified] Rounding  Encounter Overview/Reason : Palliative Care    Spiritual beliefs:      [] Involved in a sanaz tradition/spiritual practice:      [] Supported by a sanaz community:      [] Claims no spiritual orientation:      [] Seeking spiritual identity:           [] Adheres to an individual form of spirituality:      [x] Not able to assess:                Identified resources for coping and support system:   Support System: Children       [x] Prayer                  [] Devotional reading               [] Music                  [] Guided Imagery     [] Pet visits                                        [] Other: (COMMENT)     Specific area/focus of visit   Encounter:    Crisis:    Spiritual/Emotional needs:    Ritual, Rites and Sacraments:    Grief, Loss, and Adjustments:    Ethics/Mediation:    Behavioral Health:    Palliative Care: Type: Palliative Care, Initial/Spiritual Assessment  Advance Care Planning:      Plan/Referrals: Continue Support (comment)    Narrative:   Reviewed chart prior to visit on [facility] for spiritual assessment. No family/friends present. Patient was laying in bed appearing frail. She made eye contact but did not converse with . Chart review notes she has two sons and a family meeting is planned for tomorrow. Offered words of reassurance. She nodded affirmatively when asked if  could keep her in prayer.    available upon referral by staff or by family request.      CELESTINE Miles, [facility], Staff   MELANY LAWRENCE [facility] Paging Service  [phone])

## 2023-11-07 NOTE — PROGRESS NOTES
Palliative Medicine  Patient Name: Jong Kowalski  YOB: 1935  MRN: [id number]  Age: 82 y.o. Gender: female    Date of Initial Consult: 11/6/23  Date of Service: 11/7/2023  Time: 3:57 PM  Provider: Lance Vargas Day: 7  Admit Date: 11/1/2023  Referring Provider: Theron Flores MD    Reasons for Consultation:  Goals of Care    HISTORY OF PRESENT ILLNESS (HPI):   Jong Kowalski is a 82 y.o. female with a past medical history of CVA in June of 2023, dementia, a-fib, and CHF, who was admitted on 11/1/2023 from [address] [place] [address], a residential care home, with a diagnosis of hypernatremia, PHILLIP, and FTT in the setting of a recent COVID infection. 11/3:  Developed bradycardia, more alert though and started on clears  11/5:  Rehydrated, sodium normal, hypercalcemia resolved. Still not eating much    11/6:  Seems to be aware and oriented. Reports not being hungry. Cleared to eat by speech, just not interested    PALLIATIVE DIAGNOSES:    Goals of care  Anorexia  Frailty  Severe malnutrition  Palliative Care    ASSESSMENT AND PLAN:   Met with Ms Serena Huntley today along with her son Erica Arnold- we were not successful in getting ahold of Kemar (myself or Simeno Barton) and his phone was not accepting voice mail  Talked through her current situation, DNR recommendations, comfort feeding and hospice support  Erica Arnold is appropriately sad, but also shared that he recognizes that it is time. Ms Serena Huntley also nodded in agreement. Put in orders for comfort feedings, also stopped labs. OK to keep IV Fluids till discharge. Will defer medications to hospice, but while she is here its OK for her to keep her maintenance meds as long as she is willing to take them. DDNR signed and order placed  Hospice consulted- defer to case management to send referral to agency that contracts with her facility.   Will be available for support but at this point will defer planning to case managment and hospice  Please Jareth.     Electronically signed by   SEAN Orellana NP  Palliative Care Team  on 11/7/2023 at 3:57 PM

## 2023-11-07 NOTE — PLAN OF CARE
Recommendations: No, additional SLP treatment not indicated at discharge     SUBJECTIVE:   Patient nodded in response to presentation of Ensure. OBJECTIVE:     Past Medical History:   Diagnosis Date    DJD (degenerative joint disease)     Hypercalcemia     Hypertension     Vitamin D deficiency      Past Surgical History:   Procedure Laterality Date    CARPAL TUNNEL RELEASE Right     PARTIAL HYSTERECTOMY (CERVIX NOT REMOVED)       Prior Level of Function/Home Situation:   Social/Functional History  Lives With: Other (comment) (Assisted Living)  Type of Home: Assisted living  Home Equipment: Wheelchair-manual  Active : No  Patient's  Info: Pt's family or Assisted Living    Baseline Assessment:                Cognitive and Communication Status:  Neurologic State: Alert  Orientation Level: Unable to assess (history of significant mixed aphasia)  Cognition: Decreased attention/concentration and Decreased command following    Dysphagia:  P.O.  Trials:  PO Trials  Assessment Method(s): Observation  Patient Position: Upright in bed  Vocal Quality: No Impairment  Consistency Presented: Thin;Pureed  How Presented: SLP-fed/Presented;Straw;Successive Swallows;Spoon  Bolus Acceptance: No impairment  Bolus Formation/Control: Impaired  Type of Impairment: Delayed  Propulsion: Delayed (# of seconds)  Oral Residue: None  Aspiration Signs/Symptoms: None  Pharyngeal Phase Characteristics: No impairment, issues, or problems            Respiratory Status/Airway:  Room air                         After treatment:   Patient left in no apparent distress in bed, Call bell left within reach, and Nursing notified    COMMUNICATION/EDUCATION:   The patient's plan of care including recommendations, planned interventions, and recommended diet changes were discussed with: Registered nurse    Thank you,  KOREY Bone  Minutes: 15   Problem: SLP Adult - Impaired Swallowing  Goal: By Discharge: Advance to least restrictive diet without signs or symptoms of aspiration for planned discharge setting. See evaluation for individualized goals. Description: Speech Pathology Goals  Initiated 11/2/2023    1. Patient will participate in re-evaluation of swallow function within 7 days. MET 11/3/2023  Added 11/3/2023: 2. Patient will tolerate least restrictive diet without signs of aspiration or decline in respiratory status within 7 days.  MET 11/7/2023  Outcome: Completed

## 2023-11-07 NOTE — PROGRESS NOTES
Hospitalist Progress Note    NAME: Clare Browne   : 1935   MRN: [id number]     Date/Time: 2023 2:15 PM  Patient PCP: Garrett Sutherland MD    Estimated discharge date:   Anticipated Disposition / Barriers:  back to [facility] vs SNF when eating better. palliative     Assessment / Plan:     Hypernatremia (Na 161), resolved  PHILLIP, resolved  Dehydration, resolved  -Secondary to poor oral intake and dehydration  -Status post 1 L IV fluid in the ER  -Hold losartan, spironolactone - renal recommends not to restart  -Na and Cr has normalized but patient high risk for readmission due to poor intake    Hypercalcemia, resolved, from primary hyperparathyroidism  -intact  (high), Vit D 25 (10, low). -repeat iPTH 242 (high). This is consistent with primary hyperparathyroidism.   -if her intake improves and she clinically remains stable, can follow up with endocrine or PCP as outpatient  -appreciate Renal help     Failure to Thrive / Poor intake  Acute metabolic encephalopathy, improved  -multifactorial from elevated Na, dehydration, hypothermia  -CT head negative  -AMS better but does not want to eat. Tried to advance to pureed (reportedly baseline)   Family meeting today with palliative   -PT OT eval.     Recent COVID 2 weeks ago  Patient currently on room air  Chest x-ray unremarkable  Symptomatic treatment if needed  Not hypoxic. %     Thrombocytopenia, mild  -likely secondary to recent COVID  -No active bleeding - monitor    History of CVA  A-fib  CHF  -Continue aspirin, Eliquis, statin, if able to tolerate   -metoprolol on hold due to bradycardia. -Valsartan, spironolactone on hold due to PHILLIP  eliquis  restarted   -follows with Dr Lobo Major? .   Had [facility] - mild diffuse coronary atherosclerosis without stenosis     Bradycardia, 40s, ?  SSS  EKG shows widened QRS complex  Heart rate dropping to 30s and going up to 50s  S/P atropine 1mg x 1  TSH 0.9  Held metoprolol - suspect SSS  Will hold

## 2023-11-08 PROCEDURE — 2060000000 HC ICU INTERMEDIATE R&B

## 2023-11-08 PROCEDURE — 2580000003 HC RX 258: Performed by: INTERNAL MEDICINE

## 2023-11-08 PROCEDURE — 6370000000 HC RX 637 (ALT 250 FOR IP): Performed by: INTERNAL MEDICINE

## 2023-11-08 RX ORDER — AMIODARONE HYDROCHLORIDE 200 MG/1
100 TABLET ORAL DAILY
Status: DISCONTINUED | OUTPATIENT
Start: 2023-11-08 | End: 2023-11-10 | Stop reason: HOSPADM

## 2023-11-08 RX ADMIN — APIXABAN 2.5 MG: 2.5 TABLET, FILM COATED ORAL at 22:28

## 2023-11-08 RX ADMIN — SODIUM CHLORIDE, PRESERVATIVE FREE 10 ML: 5 INJECTION INTRAVENOUS at 22:28

## 2023-11-08 RX ADMIN — AMIODARONE HYDROCHLORIDE 100 MG: 200 TABLET ORAL at 10:42

## 2023-11-08 RX ADMIN — APIXABAN 2.5 MG: 2.5 TABLET, FILM COATED ORAL at 08:10

## 2023-11-08 RX ADMIN — SODIUM CHLORIDE, PRESERVATIVE FREE 10 ML: 5 INJECTION INTRAVENOUS at 08:10

## 2023-11-08 RX ADMIN — Medication: at 08:11

## 2023-11-08 RX ADMIN — Medication: at 22:29

## 2023-11-08 NOTE — PROGRESS NOTES
..End of Shift Note    Bedside shift change report given to Avani Guillory RN(oncoming nurse) by Zoltan Erickson RN (offgoing nurse). Report included the following information SBAR    Shift worked:  0700 - 1900     Shift summary and any significant changes:    Pt. Tolerated all medication w/o issue. No c/o pain or distress. Son at bedside during day shift. CM working to get patient back to her facility. Concerns for physician to address: No     Zone phone for oncoming shift:  No       Activity:     Number times ambulated in hallways past shift: 0  Number of times OOB to chair past shift: 0    Cardiac:   Cardiac Monitoring: Yes           Access:  Current line(s): PIV     Genitourinary:   Urinary status: voiding    Respiratory:      Chronic home O2 use?: NO  Incentive spirometer at bedside: No       GI:     Current diet:  ADULT DIET;  Dysphagia - Pureed  ADULT ORAL NUTRITION SUPPLEMENT; Breakfast, Dinner; Standard High Calorie/High Protein Oral Supplement  ADULT ORAL NUTRITION SUPPLEMENT; Lunch; Frozen Oral Supplement  Passing flatus: YES  Tolerating current diet: YES       Pain Management:   Patient states pain is manageable on current regimen: YES    Skin:     Interventions: increase time out of bed    Patient Safety:  Fall Score:    Interventions: bed/chair alarm and gripper socks       Length of Stay:  Expected LOS: 3  Actual LOS: 7      Zoltan Erickson RN

## 2023-11-08 NOTE — PLAN OF CARE
Problem: Discharge Planning  Goal: Discharge to home or other facility with appropriate resources  11/8/2023 0824 by Jayy Navarro RN  Outcome: Progressing  11/7/2023 1934 by Kassi Sparrow RN  Outcome: Progressing     Problem: Pain  Goal: Verbalizes/displays adequate comfort level or baseline comfort level  11/8/2023 0824 by Jayy Navarro RN  Outcome: Progressing  11/7/2023 1934 by Kassi Sparrow RN  Outcome: Progressing     Problem: Risk for Elopement  Goal: Patient will not exit the unit/facility without proper excort  Outcome: Progressing     Problem: Skin/Tissue Integrity  Goal: Absence of new skin breakdown  Description: 1. Monitor for areas of redness and/or skin breakdown  2. Assess vascular access sites hourly  3. Every 4-6 hours minimum:  Change oxygen saturation probe site  4. Every 4-6 hours:  If on nasal continuous positive airway pressure, respiratory therapy assess nares and determine need for appliance change or resting period.   Outcome: Progressing     Problem: Safety - Adult  Goal: Free from fall injury  Outcome: Progressing     Problem: Chronic Conditions and Co-morbidities  Goal: Patient's chronic conditions and co-morbidity symptoms are monitored and maintained or improved  Outcome: Progressing     Problem: ABCDS Injury Assessment  Goal: Absence of physical injury  Outcome: Progressing

## 2023-11-08 NOTE — CARE COORDINATION
Transition of Care Plan:     RUR: 18%    Prior Level of Functioning: Assistance    Disposition: [facility]  4:40 PM  [facility] received the referral and are reaching out to family. 2:39 PM   CM completed chart review. Noted Palliative put in Hospice order. CM called [facility] ([phone]) and the prefer to use [facility] or [facility]. CM talked to Spouse and he was ok with there preference. CM sending referral to [facility]: Pending in [employer]. If SNF or IPR: Date FOC offered:   Date FOC received:   Accepting facility:   Date authorization started with reference number:   Date authorization received and expires: Follow up appointments: [address] Director  DME needed: No DME needed   Transportation at discharge: S  IM/IMM Medicare/ letter given: 2nd IMM Letter   Is patient a  and connected with [facility]? If yes, was [employer] transfer form completed and [facility] notified? Caregiver Contact: Pt's son, Aide Kuhn  Discharge Caregiver contacted prior to discharge? Pt's son, [facility] needed? No  Barriers to discharge: Advance diet     CM will continue to monitor discharge plan.       Rhonda Meier, [address]  [phone]

## 2023-11-08 NOTE — PROGRESS NOTES
Hospitalist Progress Note    NAME: Clare Browne   : 1935   MRN: [id number]     Date/Time: 2023 4:34 PM  Patient PCP: Garrett Sutherland MD    Estimated discharge date:   Anticipated Disposition / Barriers:  hospice   Assessment / Plan:     Hypernatremia (Na 161), resolved  PHILLIP, resolved  Dehydration, resolved  -Secondary to poor oral intake and dehydration  -Status post 1 L IV fluid in the ER  -Hold losartan, spironolactone - renal recommends not to restart  -Na and Cr has normalized but patient high risk for readmission due to poor intake    Hypercalcemia, resolved, from primary hyperparathyroidism  -intact  (high), Vit D 25 (10, low). -repeat iPTH 242 (high). This is consistent with primary hyperparathyroidism.   -if her intake improves and she clinically remains stable, can follow up with endocrine or PCP as outpatient  -appreciate Renal help     Failure to Thrive / Poor intake  Acute metabolic encephalopathy, improved  -multifactorial from elevated Na, dehydration, hypothermia  -CT head negative  -AMS better but does not want to eat. Tried to advance to pureed (reportedly baseline)   Family meeting today with palliative , family open to hospice   -PT OT eval.     Recent COVID 2 weeks ago  Patient currently on room air  Chest x-ray unremarkable  Symptomatic treatment if needed  Not hypoxic. %     Thrombocytopenia, mild  -likely secondary to recent COVID  -No active bleeding - monitor    History of CVA  A-fib  CHF  -Continue aspirin, Eliquis, statin, if able to tolerate   -metoprolol on hold due to bradycardia. -Valsartan, spironolactone on hold due to PHILLIP  eliquis  restarted   -follows with Dr Lobo Major? .   [facility]- mild diffuse coronary atherosclerosis without stenosis     Bradycardia, 40s, ?  SSS  EKG shows widened QRS complex  Heart rate dropping to 30s and going up to 50s  S/P atropine 1mg x 1  TSH 0.9  Held metoprolol - suspect SSS  Cards input reviewed , brenna

## 2023-11-09 PROCEDURE — 2580000003 HC RX 258: Performed by: INTERNAL MEDICINE

## 2023-11-09 PROCEDURE — 6370000000 HC RX 637 (ALT 250 FOR IP): Performed by: INTERNAL MEDICINE

## 2023-11-09 PROCEDURE — 2060000000 HC ICU INTERMEDIATE R&B

## 2023-11-09 RX ADMIN — SODIUM CHLORIDE, PRESERVATIVE FREE 10 ML: 5 INJECTION INTRAVENOUS at 20:09

## 2023-11-09 RX ADMIN — Medication: at 20:10

## 2023-11-09 RX ADMIN — APIXABAN 2.5 MG: 2.5 TABLET, FILM COATED ORAL at 08:45

## 2023-11-09 RX ADMIN — Medication: at 09:17

## 2023-11-09 RX ADMIN — APIXABAN 2.5 MG: 2.5 TABLET, FILM COATED ORAL at 20:09

## 2023-11-09 RX ADMIN — SODIUM CHLORIDE, PRESERVATIVE FREE 10 ML: 5 INJECTION INTRAVENOUS at 09:19

## 2023-11-09 RX ADMIN — AMIODARONE HYDROCHLORIDE 100 MG: 200 TABLET ORAL at 08:45

## 2023-11-09 NOTE — PROGRESS NOTES
Nutrition Note    Chart reviewed and case discussed during IDR. Pt to d/c with hospice. Will sign off.      Electronically signed by Saulo Goodman RD on 11/9/23 at 2:03 PM EST    Contact:

## 2023-11-09 NOTE — PROGRESS NOTES
EP/ ARRHYTHMIA/ CARDIOLOGY Progress Note    Patient ID:  Patient: Chepe French  MRN: [id number]  Age: 82 y.o.  : 1935    Date of  Admission: 2023 10:13 AM   PCP:  Pola Edmond MD    Assessment:   Sinus node dysfunction. 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 Paroxysmal atrial fibrillation, started on amiodarone 2023. Cardiomyopathy EF 30-35% by echo 2023, likely nonischemic. Hypertension. Acute renal failure from dehydration now resolved, chronic kidney disease stage 3-4. Severe malnutrition and anorexia. 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 Hyperparathyroidism with elevated Ca. Mild thrombocytopenia. DNR. Plan:     Given her reduced functional status and higher risk for complication, I would avoid offering a dual chamber pacemaker at this time. Continue to stay off metoprolol. OK to restart amiodarone at 100 mg daily today. Over time (weeks), the lower maintenance dose will translate into slightly higher HR's. Continue anticoagulation as able. Tele with sinus rhythm 60's with mild first degree AV block. Stable cardiac plan. [x]       High complexity decision making was performed in this patient    Chepe French is a 82 y.o. female with a history of debilitating stroke earlier this year, here with dehydration, metabolic disarray, and renal failure. This has been addressed medically. I was asked to see her due to bradycardia. The interview is very limited due to aphasia. Her beta-blocker was stopped on admission. Her amiodarone was held recently and restarted at lower dose.     Allergies   Allergen Reactions    Hydrochlorothiazide Other (See Comments)     Will avoid as she has hyperparathyroidism to not make calcium levels worse          Current Facility-Administered Medications   Medication Dose Route Frequency    amiodarone (CORDARONE) tablet 100 mg  100 mg Oral Daily

## 2023-11-09 NOTE — PROGRESS NOTES
..End of Shift Note    Bedside shift change report given to Avani Guillory RN (oncoming nurse) by Zoltan Erickson RN (offgoing nurse). Report included the following information SBAR    Shift worked:  0700 - 1900     Shift summary and any significant changes:    Pt. Tolerated all medication w/o issue. No c/o pain or distress. Discharge order in.  CM indicated that the patient should be leaving tomorrow. Concerns for physician to address: No     Zone phone for oncoming shift:  No       Activity:     Number times ambulated in hallways past shift: 0  Number of times OOB to chair past shift: 0    Cardiac:   Cardiac Monitoring: Yes           Access:  Current line(s): PIV     Genitourinary:   Urinary status: voiding    Respiratory:      Chronic home O2 use?: NO  Incentive spirometer at bedside: NO       GI:     Current diet:  ADULT DIET;  Dysphagia - Pureed  ADULT ORAL NUTRITION SUPPLEMENT; Breakfast, Dinner; Standard High Calorie/High Protein Oral Supplement  ADULT ORAL NUTRITION SUPPLEMENT; Lunch; Frozen Oral Supplement  Passing flatus: YES  Tolerating current diet: YES       Pain Management:   Patient states pain is manageable on current regimen: YES    Skin:     Interventions: float heels and increase time out of bed    Patient Safety:  Fall Score:    Interventions: gripper socks and pt to call before getting OOB       Length of Stay:  Expected LOS: 8  Actual LOS: 8      Zoltan Erickson RN

## 2023-11-09 NOTE — PLAN OF CARE
Problem: Discharge Planning  Goal: Discharge to home or other facility with appropriate resources  Outcome: Progressing  Flowsheets (Taken 11/2/2023 0800 by Saige Allen RN)  Discharge to home or other facility with appropriate resources:   Identify barriers to discharge with patient and caregiver   Arrange for needed discharge resources and transportation as appropriate     Problem: Pain  Goal: Verbalizes/displays adequate comfort level or baseline comfort level  Outcome: Progressing     Problem: Risk for Elopement  Goal: Patient will not exit the unit/facility without proper excort  Outcome: Progressing     Problem: Skin/Tissue Integrity  Goal: Absence of new skin breakdown  Description: 1. Monitor for areas of redness and/or skin breakdown  2. Assess vascular access sites hourly  3. Every 4-6 hours minimum:  Change oxygen saturation probe site  4. Every 4-6 hours:  If on nasal continuous positive airway pressure, respiratory therapy assess nares and determine need for appliance change or resting period.   Outcome: Progressing     Problem: Safety - Adult  Goal: Free from fall injury  Outcome: Progressing

## 2023-11-09 NOTE — PROGRESS NOTES
Hospitalist Progress Note    NAME: Clare Browne   : 1935   MRN: [id number]     Date/Time: 2023 2:20 PM  Patient PCP: Garrett Sutherland MD    Estimated discharge date:   Anticipated Disposition / Barriers:  hospice   Assessment / Plan:     Hypernatremia (Na 161), resolved  PHILLIP, resolved  Dehydration, resolved  -Secondary to poor oral intake and dehydration  -Status post 1 L IV fluid in the ER  -Hold losartan, spironolactone - renal recommends not to restart  -Na and Cr has normalized but patient high risk for readmission due to poor intake    Hypercalcemia, resolved, from primary hyperparathyroidism  -intact  (high), Vit D 25 (10, low). -repeat iPTH 242 (high). This is consistent with primary hyperparathyroidism.   -if her intake improves and she clinically remains stable, can follow up with endocrine or PCP as outpatient  -appreciate Renal help     Failure to Thrive / Poor intake  Acute metabolic encephalopathy, improved  -multifactorial from elevated Na, dehydration, hypothermia  -CT head negative  -AMS better but does not want to eat. Tried to advance to pureed (reportedly baseline)   Family meeting today with palliative , family open to hospice   -PT OT eval.     Recent COVID 2 weeks ago  Patient currently on room air  Chest x-ray unremarkable  Symptomatic treatment if needed  Not hypoxic. %     Thrombocytopenia, mild  -likely secondary to recent COVID  -No active bleeding - monitor    History of CVA  A-fib  CHF  -Continue aspirin, Eliquis, statin, if able to tolerate   -metoprolol on hold due to bradycardia. -Valsartan, spironolactone on hold due to PHILLIP  eliquis  restarted   -follows with Dr Lobo Major? .   [facility] mild diffuse coronary atherosclerosis without stenosis     Bradycardia, 40s, ?  SSS  EKG shows widened QRS complex  Heart rate dropping to 30s and going up to 50s  S/P atropine 1mg x 1  TSH 0.9  Held metoprolol - suspect SSS  Cards input reviewed , brenna

## 2023-11-10 VITALS
WEIGHT: 109.35 LBS | HEIGHT: 60 IN | HEART RATE: 70 BPM | OXYGEN SATURATION: 94 % | DIASTOLIC BLOOD PRESSURE: 46 MMHG | SYSTOLIC BLOOD PRESSURE: 105 MMHG | TEMPERATURE: 97.5 F | BODY MASS INDEX: 21.47 KG/M2 | RESPIRATION RATE: 16 BRPM

## 2023-11-10 PROCEDURE — 6370000000 HC RX 637 (ALT 250 FOR IP): Performed by: INTERNAL MEDICINE

## 2023-11-10 PROCEDURE — 2580000003 HC RX 258: Performed by: INTERNAL MEDICINE

## 2023-11-10 RX ORDER — AMIODARONE HYDROCHLORIDE 100 MG/1
100 TABLET ORAL DAILY
Qty: 30 TABLET | Refills: 1 | Status: SHIPPED | OUTPATIENT
Start: 2023-11-11 | End: 2024-01-10

## 2023-11-10 RX ADMIN — AMIODARONE HYDROCHLORIDE 100 MG: 200 TABLET ORAL at 09:00

## 2023-11-10 RX ADMIN — Medication: at 08:00

## 2023-11-10 RX ADMIN — APIXABAN 2.5 MG: 2.5 TABLET, FILM COATED ORAL at 09:00

## 2023-11-10 RX ADMIN — SODIUM CHLORIDE, PRESERVATIVE FREE 10 ML: 5 INJECTION INTRAVENOUS at 08:01

## 2023-11-10 NOTE — CARE COORDINATION
Patient discharging today . [facility] called and spoke with Brii Bell at [phone]. States that her room is ready for return. CM spoke with Baltazar Robertson at [facility], [phone]. States that a nurse will arrive at the facility at 1:00 pm to admit to hospice. CM supervisor checked and [facility] ambulance is set to arrive at the hospital at 11:30 am.   Spoke with sonArlen at [phone]. Explained that all arrangements have been made and patient to leave at 11:30 am.  No other CM needs identified. Patient okay to discharge from CM standpoint. 11/10/23 [facility] Discharge   Transition of Care Consult (CM Consult) Hospice;Assisted Living   Internal Hospice No   Services At/After Discharge Assisted living;Hospice  ([facility])   [address] Provided? No   Mode of Transport at Discharge [address] Time of Discharge 1130  ([facility] ambulance)   Confirm Follow Up Transport Other (see comment)  ([facility] ambulance)   Condition of Participation: Discharge Planning   The Plan for Transition of Care is related to the following treatment goals: Hospice to admit at 1:00 PM today  [facility])   The Patient and/or Patient Representative was provided with a Choice of Provider? Patient Representative   Name of the Patient Representative who was provided with the Choice of Provider and agrees with the Discharge Plan? Arlen Keith   The Patient and/Or Patient Representative agree with the Discharge Plan? Yes   Freedom of Choice list was provided with basic dialogue that supports the patient's individualized plan of care/goals, treatment preferences, and shares the quality data associated with the providers?   No  (discussed with son what options available at facility)     Jose Cruz Rob RN  Care Manager

## 2023-11-10 NOTE — PROGRESS NOTES
EP/ ARRHYTHMIA/ CARDIOLOGY Progress Note    Patient ID:  Patient: Danny Salazar  MRN: [id number]  Age: 82 y.o.  : 1935    Date of  Admission: 2023 10:13 AM   PCP:  Elijah Proctor MD    Assessment:   Sinus node dysfunction. 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 Paroxysmal atrial fibrillation, started on amiodarone 2023. Cardiomyopathy EF 30-35% by echo 2023, likely nonischemic. Hypertension. Acute renal failure from dehydration now resolved, chronic kidney disease stage 3-4. Severe malnutrition and anorexia. 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 Hyperparathyroidism with elevated Ca. Mild thrombocytopenia. DNR. Plan:     Given her reduced functional status and higher risk for complication, I would avoid offering a dual chamber pacemaker at this time. Continue to stay off metoprolol. Restarted amiodarone at 100 mg daily. Over time (weeks), the lower maintenance dose will translate into slightly higher HR's. Continue anticoagulation as able. Tele with sinus rhythm 60's with mild first degree AV block. Stable cardiac plan. [x]       High complexity decision making was performed in this patient    Danny Salazar is a 82 y.o. female with a history of debilitating stroke earlier this year, here with dehydration, metabolic disarray, and renal failure. This has been addressed medically. I was asked to see her due to bradycardia. The interview is very limited due to aphasia. Her beta-blocker was stopped on admission. Her amiodarone was held recently and restarted at lower dose.     Allergies   Allergen Reactions    Hydrochlorothiazide Other (See Comments)     Will avoid as she has hyperparathyroidism to not make calcium levels worse          Current Facility-Administered Medications   Medication Dose Route Frequency    amiodarone (CORDARONE) tablet 100 mg  100 mg Oral Daily

## 2023-11-10 NOTE — DISCHARGE SUMMARY
Discharge Summary    Name: Sherri Merino  [id number]  YOB: 1935 (Age: 82 y.o.)   Date of Admission: 11/1/2023  Date of Discharge: 11/10/2023  Attending Physician: Roshan Chaparro MD    Discharge Diagnosis:   Hypernatremia (Na 161), resolved  PHILLIP, resolved  Dehydration, resolved  Hypercalcemia, resolved, from primary hyperparathyroidism  Failure to Thrive / Poor intake  Acute metabolic encephalopathy, improved  Recent COVID 2 weeks ago  Thrombocytopenia, mild  -History of CVA  A-fib  CHF  Bradycardia, 40s, ? SSS  Hypothermia, resolved  Consultations:  IP CONSULT TO DIETITIAN  IP CONSULT TO NEPHROLOGY  IP CONSULT TO CARDIOLOGY  IP CONSULT TO CASE MANAGEMENT  IP CONSULT TO CARDIOLOGY  IP CONSULT TO PALLIATIVE CARE  IP CONSULT TO HOSPICE      Brief Admission History/Reason for Admission Per Mary Verma MD:   CHIEF COMPLAINT: Worsening lethargy     HISTORY OF PRESENT ILLNESS:     Diego Martinez is a 82 y.o.  female with PMHx significant forPast medical history of CVA, dementia, A-fib, CHF who lives in an adult care was diagnosed with COVID 2 weeks ago presented with increased lethargy and poor oral intake since 2 weeks ago. Family visited the patient today and found to be more lethargic and less talkative her baseline. Patient unable to provide any history, no family members at bedside. Most of the history from discussion with the ER physician. I called Mr. Fung's [phone], left voice message for call back. Per ER report patient walks up with a walker at baseline lives in an adult care/?  Group home, not very verbal post CVA in June 2023.      Patient currently AO x1 speech difficult to understand, following commandsVital stable lab review shows sodium of 159, creatinine of 1.53 elevated from baseline of 0.8, calcium of 12.2 TSH normal WBC normal hemoglobin normal platelets of 167 decreased from 176 UA pending chest x-ray no acute process     We were asked

## 2023-11-10 NOTE — PLAN OF CARE
Problem: Discharge Planning  Goal: Discharge to home or other facility with appropriate resources  Outcome: Progressing     Problem: Pain  Goal: Verbalizes/displays adequate comfort level or baseline comfort level  Outcome: Progressing     Problem: Risk for Elopement  Goal: Patient will not exit the unit/facility without proper excort  Outcome: Progressing     Problem: Skin/Tissue Integrity  Goal: Absence of new skin breakdown  Description: 1. Monitor for areas of redness and/or skin breakdown  2. Assess vascular access sites hourly  3. Every 4-6 hours minimum:  Change oxygen saturation probe site  4. Every 4-6 hours:  If on nasal continuous positive airway pressure, respiratory therapy assess nares and determine need for appliance change or resting period. Outcome: Progressing     Problem: Skin/Tissue Integrity  Goal: Absence of new skin breakdown  Description: 1. Monitor for areas of redness and/or skin breakdown  2. Assess vascular access sites hourly  3. Every 4-6 hours minimum:  Change oxygen saturation probe site  4. Every 4-6 hours:  If on nasal continuous positive airway pressure, respiratory therapy assess nares and determine need for appliance change or resting period.   Outcome: Progressing     Problem: Safety - Adult  Goal: Free from fall injury  Outcome: Progressing

## 2023-11-10 NOTE — CARE COORDINATION
CM call to [facility] at [phone].  Message left and will have coordinator, Francisca Hidalgo, call back on status of referral.    Naun Villasenor, RN  Care Manager

## 2023-11-10 NOTE — DISCHARGE INSTRUCTIONS
DISCHARGE DIAGNOSIS:  Hypernatremia (Na 161), resolved  PHILLIP, resolved  Dehydration, resolved  Hypercalcemia, resolved, from primary hyperparathyroidism  Failure to Thrive / Poor intake  Acute metabolic encephalopathy, improved  Recent COVID 2 weeks ago  Thrombocytopenia, mild  -History of CVA  A-fib  CHF  Bradycardia, 40s, ? SSS  Hypothermia, resolved  -     MEDICATIONS:  It is important that you take the medication exactly as they are prescribed. Keep your medication in the bottles provided by the pharmacist and keep a list of the medication names, dosages, and times to be taken in your wallet. Do not take other medications without consulting your doctor. Pain Management: per above medications    What to do at Home    Recommended diet:  {diet:51230}    Recommended activity: {discharge activity:06985}    If you have questions regarding the hospital related prescriptions or hospital related issues please call [facility] at . You can always direct your questions to your primary care doctor if you are unable to reach your hospital physician; your PCP works as an extension of your hospital doctor just like your hospital doctor is an extension of your PCP for your time at the hospital [facility]).     If you experience any of the following symptoms then please call your primary care physician or return to the emergency room if you cannot get hold of your doctor:  Fever, chills, nausea, vomiting, diarrhea, change in mentation, falling, bleeding, shortness of breath, ***